# Patient Record
Sex: MALE | Race: BLACK OR AFRICAN AMERICAN | Employment: FULL TIME | URBAN - METROPOLITAN AREA
[De-identification: names, ages, dates, MRNs, and addresses within clinical notes are randomized per-mention and may not be internally consistent; named-entity substitution may affect disease eponyms.]

---

## 2020-12-10 ENCOUNTER — HOSPITAL ENCOUNTER (OUTPATIENT)
Age: 60
Setting detail: OBSERVATION
Discharge: HOME | End: 2020-12-11
Attending: EMERGENCY MEDICINE | Admitting: INTERNAL MEDICINE

## 2020-12-10 PROBLEM — E11.00 DM HYPEROSMOLARITY TYPE II, UNCONTROLLED (HCC): Status: ACTIVE | Noted: 2020-12-10

## 2020-12-10 LAB
A/G RATIO: 1.3 (ref 1.1–2.2)
ALBUMIN SERPL-MCNC: 4.5 G/DL (ref 3.4–5)
ALP BLD-CCNC: 144 U/L (ref 40–129)
ALT SERPL-CCNC: 36 U/L (ref 10–40)
ANION GAP SERPL CALCULATED.3IONS-SCNC: 16 MMOL/L (ref 3–16)
AST SERPL-CCNC: 32 U/L (ref 15–37)
BASOPHILS ABSOLUTE: 0.1 K/UL (ref 0–0.2)
BASOPHILS RELATIVE PERCENT: 1.4 %
BILIRUB SERPL-MCNC: 0.7 MG/DL (ref 0–1)
BILIRUBIN URINE: NEGATIVE
BLOOD, URINE: NEGATIVE
BUN BLDV-MCNC: 16 MG/DL (ref 7–20)
CALCIUM SERPL-MCNC: 9.6 MG/DL (ref 8.3–10.6)
CHLORIDE BLD-SCNC: 89 MMOL/L (ref 99–110)
CLARITY: CLEAR
CO2: 23 MMOL/L (ref 21–32)
COLOR: YELLOW
CREAT SERPL-MCNC: 1.1 MG/DL (ref 0.8–1.3)
CREATININE URINE: 80.2 MG/DL (ref 39–259)
EKG ATRIAL RATE: 72 BPM
EKG DIAGNOSIS: NORMAL
EKG P AXIS: 3 DEGREES
EKG P-R INTERVAL: 158 MS
EKG Q-T INTERVAL: 396 MS
EKG QRS DURATION: 84 MS
EKG QTC CALCULATION (BAZETT): 433 MS
EKG R AXIS: 27 DEGREES
EKG T AXIS: 18 DEGREES
EKG VENTRICULAR RATE: 72 BPM
EOSINOPHILS ABSOLUTE: 0.1 K/UL (ref 0–0.6)
EOSINOPHILS RELATIVE PERCENT: 2.1 %
GFR AFRICAN AMERICAN: >60
GFR NON-AFRICAN AMERICAN: >60
GLOBULIN: 3.5 G/DL
GLUCOSE BLD-MCNC: 343 MG/DL (ref 70–99)
GLUCOSE BLD-MCNC: 379 MG/DL (ref 70–99)
GLUCOSE BLD-MCNC: 387 MG/DL (ref 70–99)
GLUCOSE BLD-MCNC: 397 MG/DL (ref 70–99)
GLUCOSE BLD-MCNC: 522 MG/DL (ref 70–99)
GLUCOSE URINE: >=1000 MG/DL
HCT VFR BLD CALC: 41 % (ref 40.5–52.5)
HEMOGLOBIN: 14 G/DL (ref 13.5–17.5)
KETONES, URINE: ABNORMAL MG/DL
LEUKOCYTE ESTERASE, URINE: NEGATIVE
LYMPHOCYTES ABSOLUTE: 1.6 K/UL (ref 1–5.1)
LYMPHOCYTES RELATIVE PERCENT: 27.1 %
MCH RBC QN AUTO: 28.6 PG (ref 26–34)
MCHC RBC AUTO-ENTMCNC: 34.1 G/DL (ref 31–36)
MCV RBC AUTO: 83.8 FL (ref 80–100)
MICROALBUMIN UR-MCNC: 2.2 MG/DL
MICROALBUMIN/CREAT UR-RTO: 27.4 MG/G (ref 0–30)
MICROSCOPIC EXAMINATION: ABNORMAL
MONOCYTES ABSOLUTE: 0.3 K/UL (ref 0–1.3)
MONOCYTES RELATIVE PERCENT: 5.4 %
NEUTROPHILS ABSOLUTE: 3.7 K/UL (ref 1.7–7.7)
NEUTROPHILS RELATIVE PERCENT: 64 %
NITRITE, URINE: NEGATIVE
PDW BLD-RTO: 13.6 % (ref 12.4–15.4)
PERFORMED ON: ABNORMAL
PH UA: 5 (ref 5–8)
PLATELET # BLD: 228 K/UL (ref 135–450)
PMV BLD AUTO: 9.7 FL (ref 5–10.5)
POTASSIUM REFLEX MAGNESIUM: 4.3 MMOL/L (ref 3.5–5.1)
PROTEIN UA: NEGATIVE MG/DL
RBC # BLD: 4.89 M/UL (ref 4.2–5.9)
SODIUM BLD-SCNC: 128 MMOL/L (ref 136–145)
SPECIFIC GRAVITY UA: >1.03 (ref 1–1.03)
TOTAL PROTEIN: 8 G/DL (ref 6.4–8.2)
TROPONIN: <0.01 NG/ML
URINE REFLEX TO CULTURE: ABNORMAL
URINE TYPE: ABNORMAL
UROBILINOGEN, URINE: 0.2 E.U./DL
WBC # BLD: 5.7 K/UL (ref 4–11)

## 2020-12-10 PROCEDURE — 99223 1ST HOSP IP/OBS HIGH 75: CPT | Performed by: INTERNAL MEDICINE

## 2020-12-10 PROCEDURE — 93010 ELECTROCARDIOGRAM REPORT: CPT | Performed by: INTERNAL MEDICINE

## 2020-12-10 PROCEDURE — 96361 HYDRATE IV INFUSION ADD-ON: CPT

## 2020-12-10 PROCEDURE — 80053 COMPREHEN METABOLIC PANEL: CPT

## 2020-12-10 PROCEDURE — 96374 THER/PROPH/DIAG INJ IV PUSH: CPT

## 2020-12-10 PROCEDURE — 84484 ASSAY OF TROPONIN QUANT: CPT

## 2020-12-10 PROCEDURE — 6370000000 HC RX 637 (ALT 250 FOR IP): Performed by: EMERGENCY MEDICINE

## 2020-12-10 PROCEDURE — 96372 THER/PROPH/DIAG INJ SC/IM: CPT

## 2020-12-10 PROCEDURE — 96360 HYDRATION IV INFUSION INIT: CPT

## 2020-12-10 PROCEDURE — 81003 URINALYSIS AUTO W/O SCOPE: CPT

## 2020-12-10 PROCEDURE — 2580000003 HC RX 258: Performed by: INTERNAL MEDICINE

## 2020-12-10 PROCEDURE — 94760 N-INVAS EAR/PLS OXIMETRY 1: CPT

## 2020-12-10 PROCEDURE — 93005 ELECTROCARDIOGRAM TRACING: CPT | Performed by: EMERGENCY MEDICINE

## 2020-12-10 PROCEDURE — 82043 UR ALBUMIN QUANTITATIVE: CPT

## 2020-12-10 PROCEDURE — 2580000003 HC RX 258: Performed by: EMERGENCY MEDICINE

## 2020-12-10 PROCEDURE — G0378 HOSPITAL OBSERVATION PER HR: HCPCS

## 2020-12-10 PROCEDURE — 82570 ASSAY OF URINE CREATININE: CPT

## 2020-12-10 PROCEDURE — 6370000000 HC RX 637 (ALT 250 FOR IP): Performed by: INTERNAL MEDICINE

## 2020-12-10 PROCEDURE — 99283 EMERGENCY DEPT VISIT LOW MDM: CPT

## 2020-12-10 PROCEDURE — 85025 COMPLETE CBC W/AUTO DIFF WBC: CPT

## 2020-12-10 PROCEDURE — 6360000002 HC RX W HCPCS: Performed by: INTERNAL MEDICINE

## 2020-12-10 RX ORDER — NICOTINE POLACRILEX 4 MG
15 LOZENGE BUCCAL PRN
Status: DISCONTINUED | OUTPATIENT
Start: 2020-12-10 | End: 2020-12-11 | Stop reason: HOSPADM

## 2020-12-10 RX ORDER — INSULIN GLARGINE 100 [IU]/ML
18 INJECTION, SOLUTION SUBCUTANEOUS ONCE
Status: COMPLETED | OUTPATIENT
Start: 2020-12-10 | End: 2020-12-10

## 2020-12-10 RX ORDER — SODIUM CHLORIDE 0.9 % (FLUSH) 0.9 %
10 SYRINGE (ML) INJECTION PRN
Status: DISCONTINUED | OUTPATIENT
Start: 2020-12-10 | End: 2020-12-11 | Stop reason: HOSPADM

## 2020-12-10 RX ORDER — POLYETHYLENE GLYCOL 3350 17 G/17G
17 POWDER, FOR SOLUTION ORAL DAILY PRN
Status: DISCONTINUED | OUTPATIENT
Start: 2020-12-10 | End: 2020-12-11 | Stop reason: HOSPADM

## 2020-12-10 RX ORDER — 0.9 % SODIUM CHLORIDE 0.9 %
1000 INTRAVENOUS SOLUTION INTRAVENOUS ONCE
Status: COMPLETED | OUTPATIENT
Start: 2020-12-10 | End: 2020-12-10

## 2020-12-10 RX ORDER — SODIUM CHLORIDE 9 MG/ML
INJECTION, SOLUTION INTRAVENOUS CONTINUOUS
Status: DISCONTINUED | OUTPATIENT
Start: 2020-12-10 | End: 2020-12-11 | Stop reason: HOSPADM

## 2020-12-10 RX ORDER — DEXTROSE MONOHYDRATE 50 MG/ML
100 INJECTION, SOLUTION INTRAVENOUS PRN
Status: DISCONTINUED | OUTPATIENT
Start: 2020-12-10 | End: 2020-12-11 | Stop reason: HOSPADM

## 2020-12-10 RX ORDER — PROMETHAZINE HYDROCHLORIDE 25 MG/1
12.5 TABLET ORAL EVERY 6 HOURS PRN
Status: DISCONTINUED | OUTPATIENT
Start: 2020-12-10 | End: 2020-12-11 | Stop reason: HOSPADM

## 2020-12-10 RX ORDER — ACETAMINOPHEN 650 MG/1
650 SUPPOSITORY RECTAL EVERY 6 HOURS PRN
Status: DISCONTINUED | OUTPATIENT
Start: 2020-12-10 | End: 2020-12-11 | Stop reason: HOSPADM

## 2020-12-10 RX ORDER — DEXTROSE MONOHYDRATE 25 G/50ML
12.5 INJECTION, SOLUTION INTRAVENOUS PRN
Status: DISCONTINUED | OUTPATIENT
Start: 2020-12-10 | End: 2020-12-11 | Stop reason: HOSPADM

## 2020-12-10 RX ORDER — ONDANSETRON 2 MG/ML
4 INJECTION INTRAMUSCULAR; INTRAVENOUS EVERY 6 HOURS PRN
Status: DISCONTINUED | OUTPATIENT
Start: 2020-12-10 | End: 2020-12-11 | Stop reason: HOSPADM

## 2020-12-10 RX ORDER — ACETAMINOPHEN 325 MG/1
650 TABLET ORAL EVERY 6 HOURS PRN
Status: DISCONTINUED | OUTPATIENT
Start: 2020-12-10 | End: 2020-12-11 | Stop reason: HOSPADM

## 2020-12-10 RX ORDER — NAPROXEN 500 MG/1
500 TABLET ORAL 2 TIMES DAILY PRN
Status: ON HOLD | COMMUNITY
End: 2020-12-11 | Stop reason: HOSPADM

## 2020-12-10 RX ORDER — SODIUM CHLORIDE 0.9 % (FLUSH) 0.9 %
10 SYRINGE (ML) INJECTION EVERY 12 HOURS SCHEDULED
Status: DISCONTINUED | OUTPATIENT
Start: 2020-12-10 | End: 2020-12-11 | Stop reason: HOSPADM

## 2020-12-10 RX ORDER — ATORVASTATIN CALCIUM 20 MG/1
20 TABLET, FILM COATED ORAL NIGHTLY
Status: DISCONTINUED | OUTPATIENT
Start: 2020-12-10 | End: 2020-12-11 | Stop reason: HOSPADM

## 2020-12-10 RX ADMIN — METFORMIN HYDROCHLORIDE 500 MG: 500 TABLET ORAL at 17:22

## 2020-12-10 RX ADMIN — INSULIN HUMAN 10 UNITS: 100 INJECTION, SOLUTION PARENTERAL at 11:17

## 2020-12-10 RX ADMIN — ATORVASTATIN CALCIUM 20 MG: 20 TABLET, FILM COATED ORAL at 23:12

## 2020-12-10 RX ADMIN — SODIUM CHLORIDE 1000 ML: 9 INJECTION, SOLUTION INTRAVENOUS at 10:27

## 2020-12-10 RX ADMIN — SODIUM CHLORIDE: 9 INJECTION, SOLUTION INTRAVENOUS at 14:42

## 2020-12-10 RX ADMIN — ENOXAPARIN SODIUM 40 MG: 40 INJECTION SUBCUTANEOUS at 23:12

## 2020-12-10 RX ADMIN — INSULIN GLARGINE 18 UNITS: 100 INJECTION, SOLUTION SUBCUTANEOUS at 14:42

## 2020-12-10 RX ADMIN — SODIUM CHLORIDE 1000 ML: 9 INJECTION, SOLUTION INTRAVENOUS at 11:17

## 2020-12-10 RX ADMIN — INSULIN LISPRO 10 UNITS: 100 INJECTION, SOLUTION INTRAVENOUS; SUBCUTANEOUS at 17:22

## 2020-12-10 RX ADMIN — INSULIN LISPRO 5 UNITS: 100 INJECTION, SOLUTION INTRAVENOUS; SUBCUTANEOUS at 23:12

## 2020-12-10 ASSESSMENT — ENCOUNTER SYMPTOMS
RHINORRHEA: 0
WHEEZING: 0
BACK PAIN: 0
EYE DISCHARGE: 0
EYE PAIN: 0
SHORTNESS OF BREATH: 0
SORE THROAT: 0
COUGH: 0
NAUSEA: 0
DIARRHEA: 0
EYE REDNESS: 0
VOMITING: 0
ABDOMINAL PAIN: 0

## 2020-12-10 ASSESSMENT — PAIN DESCRIPTION - LOCATION: LOCATION: FOOT

## 2020-12-10 ASSESSMENT — PAIN DESCRIPTION - PROGRESSION
CLINICAL_PROGRESSION: NOT CHANGED

## 2020-12-10 ASSESSMENT — PAIN SCALES - GENERAL
PAINLEVEL_OUTOF10: 3
PAINLEVEL_OUTOF10: 0

## 2020-12-10 ASSESSMENT — PAIN DESCRIPTION - ONSET: ONSET: ON-GOING

## 2020-12-10 ASSESSMENT — PAIN DESCRIPTION - PAIN TYPE: TYPE: ACUTE PAIN

## 2020-12-10 ASSESSMENT — PAIN DESCRIPTION - ORIENTATION: ORIENTATION: RIGHT;LEFT

## 2020-12-10 ASSESSMENT — PAIN - FUNCTIONAL ASSESSMENT: PAIN_FUNCTIONAL_ASSESSMENT: ACTIVITIES ARE NOT PREVENTED

## 2020-12-10 ASSESSMENT — PAIN DESCRIPTION - FREQUENCY: FREQUENCY: INTERMITTENT

## 2020-12-10 ASSESSMENT — PAIN DESCRIPTION - DESCRIPTORS: DESCRIPTORS: SORE

## 2020-12-10 NOTE — PROGRESS NOTES
Pre dinner blood sugar is 397. Spoke with Dr Payton Hays about this, see orders to change low insulin sliding scale to medium sliding scale. Will continue to monitor.

## 2020-12-10 NOTE — ED NOTES
ED SBAR report provider to [address]. Patient to be transported to Room 4125 via stretcher by ED tech. Patient transported with bedside cardiac monitor. IV site clean, dry, and intact. MEWS score and pain assessed as 0 and documented. Updated patient on plan of care.        Marian Bardales RN  12/10/20 1605

## 2020-12-10 NOTE — H&P
Hospital Medicine History & Physical    Patient:  Roberta Greenberg  YOB: 1960  Date of Service: 12/10/20  MRN: [id number]    PCP: Freddi Skiff, MD    Date of Admission: 12/10/2020      Chief Complaint:    Chief Complaint   Patient presents with    Fatigue     x3-4 days, \"been going to the bathroom every 10-20 minutes, very dehydrated\"         History Of Present Illness: The patient is a 63 y.o. male who presents to [facility] with c/o as above  Diagnosed with hyperglycemia    Past Medical History:    History reviewed. No pertinent past medical history. Past Surgical History:    History reviewed. No pertinent surgical history. Family History:  History reviewed. No pertinent family history. Medications Prior to Admission:    Prior to Admission medications    Medication Sig Start Date End Date Taking? Authorizing Provider   naproxen (NAPROSYN) 500 MG tablet Take 500 mg by mouth 2 times daily as needed for Pain   Yes Historical Provider, MD       Allergies:  Patient has no known allergies.     Social History:    Social History     Socioeconomic History    Marital status:      Spouse name: Not on file    Number of children: Not on file    Years of education: Not on file    Highest education level: Not on file   Occupational History    Not on file   Social Needs    Financial resource strain: Not on file    Food insecurity     Worry: Not on file     Inability: Not on file    Transportation needs     Medical: Not on file     Non-medical: Not on file   Tobacco Use    Smoking status: Never Smoker    Smokeless tobacco: Never Used   Substance and Sexual Activity    Alcohol use: Not on file    Drug use: Not on file    Sexual activity: Not on file   Lifestyle    Physical activity     Days per week: Not on file     Minutes per session: Not on file    Stress: Not on file   Relationships    Social connections     Talks on phone: Not on file     Gets together: Not on file     Attends Anglican service: Not on file     Active member of club or organization: Not on file     Attends meetings of clubs or organizations: Not on file     Relationship status: Not on file    Intimate partner violence     Fear of current or ex partner: Not on file     Emotionally abused: Not on file     Physically abused: Not on file     Forced sexual activity: Not on file   Other Topics Concern    Not on file   Social History Narrative    Not on file       TOBACCO:   reports that he has never smoked. He has never used smokeless tobacco.  ETOH:   has no history on file for alcohol. REVIEW OF SYSTEMS:    Vital: /71   Pulse 82   Temp 98.3 °F (36.8 °C) (Oral)   Resp 17   Ht 5' 7\" (1.702 m)   SpO2 98%   Constitutional: no fever, no night sweats,  fatigue  Head: no headache, no head injury, no migranes. Eye: no blurring of vision, no double vision. Ears: no hearing difficulty, no tinnitus  Mouth/throat: no ulceration, dental caries, dysphagia  Lungs: no cough, no shortness of breath, no wheeze  CVS: no palpitation, no chest pain, no shortness of breath  GI: no abdominal pain, no nausea , no vomiting, no constipation  HANNA: no dysuria, frequency and urgency, no hematuria, no kidney stones  Musculoskeletal:back pain  Endocrine: no polyuria, polydypsia, no cold or heat intolerence  Hematology: no anemia, no easy brusing or bleeding, no hx of clotting disorder  Dermatology: no skin rash, no eczema, no prurities,  Psychiatry: no depression, no anxiety,no panic attacks, no suicide ideation  Neurology: no syncope, no seizures, no numbness or tingling of hands, no numbness or tingling of feet, no paresis      PHYSICAL EXAM:  General:  Awake, alert, not in distress. Appears to be stated age. HEENT: Atraumatic, normocephalic. PERRLA. EOM intact.  Pink conjunctiva, anicteric sclera. Pink and moist oral mucosa. No lymphadenopathy. Trachea midline. Thyroid non palpable. Neck supple. No carotid bruit. No JVD. Chest: Bilateal air entry, Clear to auscultation, no wheezing, rhonchi or rales. Cardiovascular: RRR, K9N4, no murmur, click, rub or gallop. No lower extremity edema. Pedal and posterior tibialis 2+. Abdomen: Soft, non tender to palpation. Active bowel sounds x 4 quadrants. Musculoskeletal: Limitation of the rom of the joints and LSS  SLR is positive on the affected side  No gross weak ness appreciated  CNS: Oriented to person, place and time. CXR:   No orders to display     EKG:  I have reviewed the EKG. CBC   Recent Labs     12/10/20  1010   WBC 5.7   HGB 14.0   HCT 41.0         RENAL  Recent Labs     12/10/20  1010   *   K 4.3   CL 89*   CO2 23   BUN 16   CREATININE 1.1     LFT'S  Recent Labs     12/10/20  1010   AST 32   ALT 36   BILITOT 0.7   ALKPHOS 144*     COAG  No results for input(s): INR in the last 72 hours. CARDIAC ENZYMES  Recent Labs     12/10/20  1010   TROPONINI <0.01       U/A:    Lab Results   Component Value Date    COLORU YELLOW 12/10/2020    CLARITYU Clear 12/10/2020    SPECGRAV >1.030 12/10/2020    LEUKOCYTESUR Negative 12/10/2020    BLOODU Negative 12/10/2020    GLUCOSEU >=1000 12/10/2020       ABG  No results found for: QMC5YHP, BEART, V4YQBWSA, PHART, THGBART, GZM0TAX, PO2ART, DDL6JQL        There are no active hospital problems to display for this patient. ASSESSMENT/PLAN:  New onset DM with hyperosmolarity  High cholesterol    Pt is stable. Discussed with staff and pt about the plan. See the orders for further plan. Will proceed further acc to pt's progress.     Electronically signed by Bryson Snyder MD on 12/10/2020 at 12:08 PM

## 2020-12-10 NOTE — PROGRESS NOTES
Admitted to room 4125 from ER. Oriented to room and call light. Vitals and assessment completed. No distress noted.

## 2020-12-10 NOTE — CARE COORDINATION
[facility]  Diabetes Education   Progress Note       NAME:  Marylee Fortis  MEDICAL RECORD NUMBER:  [id number]  AGE: 63 y.o. GENDER: male  : 1960  TODAY'S DATE:  12/10/2020    Subjective   Reason for Diabetic Education Evaluation and Assessment: new onset diabetes     Evelyn Colon agrees to meet with me for diabetes education. He is not surprised by the diagnosis since both of his parents had diabetes. 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 Visit Type: evaluation      Marylee Fortis is a 63 y.o. male referred by:     [x] Physician  [] Nursing  [] Chart Review   [] Other:     PAST MEDICAL HISTORY    History reviewed. No pertinent past medical history. PAST SURGICAL HISTORY    History reviewed. No pertinent surgical history. FAMILY HISTORY    History reviewed. No pertinent family history.     SOCIAL HISTORY    Social History     Tobacco Use    Smoking status: Never Smoker    Smokeless tobacco: Never Used   Substance Use Topics    Alcohol use: Not on file    Drug use: Not on file       ALLERGIES    No Known Allergies    MEDICATIONS     sodium chloride flush  10 mL Intravenous 2 times per day    enoxaparin  40 mg Subcutaneous Nightly    insulin lispro  0-6 Units Subcutaneous TID WC    insulin lispro  0-3 Units Subcutaneous Nightly    metFORMIN  500 mg Oral BID WC    atorvastatin  20 mg Oral Nightly       Objective        Patient Active Problem List   Diagnosis Code    DM hyperosmolarity type II, uncontrolled (HCC) E11.00, E11.65        BP (!) 153/86   Pulse 79   Temp 98.4 °F (36.9 °C) (Oral)   Resp 16   Ht 5' 7\" (1.702 m)   Wt 237 lb 7 oz (107.7 kg)   SpO2 96%   BMI 37.19 kg/m²     HgBA1c:  No results found for: LABA1C    Recent Labs     12/10/20  1214 12/10/20  1435 12/10/20  1646   POCGLU 379* 343* 397*       BUN/Creatinine:    Lab Results   Component Value Date    BUN 16 12/10/2020 CREATININE 1.1 12/10/2020       Assessment        Diabetes Management and Education    Does the patient have a Primary Care Physician? Yes, Jenifer Cunningham MD       Does the patient require new medication instruction? Yes,     Person responsible for administration of Insulin/Medication:       [x] Self     [] Caregiver       [] Spouse       [] Other Family Member   []  Other    Insulin Instruction:  insulin pen  Injection Site:   [x] location    [x] rotation     Level of patient/caregiver understanding able to:       [x] Verbalized Understanding   [] Demonstrated Understanding       [x] Teach Back       [] Needs Reinforcement     []  Other:        Does the patient/caregiver monitor Blood Glucoses? No:   Reviewed glycemic control targets, testing frequency and when to call PCP. Level of patient/caregiver understanding able to:        [x] Verbalized Understanding   [] Demonstrated Understanding       [] Teach Back       [] Needs Reinforcement     []  Other:        Does the patient/caregiver follow a Meal Plan? No: Drinks water, juice and regular soda. Recommend making water, unsweetened tea or coffee primary drinks. Reviewed importance of eating three meals per day and plate method for consistent carb intake. Level of patient/caregiver understanding able to:       [x] Verbalized Understanding   [] Demonstrated Understanding       [] Teach Back       [] Needs Reinforcement     []  Other:      Does the patient/caregiver understand S/S of Hypoglycemia? No:   Reviewed symptoms, prevention and treatment. Level of patient/caregiver understanding able to:       [x] Verbalized Understanding   [] Demonstrated Understanding       [] Teach Back       [] Needs Reinforcement     []  Other:                    Does the patient/caregiver understand S/S of Hyperglycemia? No:     Reviewed symptoms, prevention and treatment.     Level of patient/caregiver understanding able to:        [x] Verbalized Understanding

## 2020-12-10 NOTE — ED PROVIDER NOTES
[address] [place] [address]  eMERGENCY dEPARTMENT eNCOUnter        Pt Name: Roberta Greenberg  MRN: [id number]  [place] 1960  Date of evaluation: 12/10/2020  Provider: Cecile Nava MD  PCP: Freddi Skiff, MD      [address] [place] [address]       Chief Complaint   Patient presents with    Fatigue     x3-4 days, \"been going to the bathroom every 10-20 minutes, very dehydrated\"       HISTORY OFPRESENT ILLNESS   (Location/Symptom, Timing/Onset, Context/Setting, Quality, Duration, Modifying Factors,Severity)  Note limiting factors. Roberta Greenberg is a 63 y.o. male       Location/Symptom: Complaining of feeling tired and feeling like he is dehydrated  Timing/Onset: 4 days  Context/Setting: Ventricular the patient has multiple symptoms that are consistent with new onset diabetes. It does run in his family. He is complaining of a dry mouth polyuria blurred vision and generalized fatigue. Quality: He is not really having any chest pain headache fever chills abdominal pain or nausea. Duration: 4 days  Modifying Factors: None  Severity: 0    Nursing Noteswere all reviewed and agreed with or any disagreements were addressed  in the HPI. REVIEW OF SYSTEMS    (2-9 systems for level 4, 10 or more for level 5)     Review of Systems   Constitutional: Positive for fatigue. Negative for chills and fever. HENT: Negative for ear pain, rhinorrhea and sore throat. Eyes: Positive for visual disturbance. Negative for pain, discharge and redness. Respiratory: Negative for cough, shortness of breath and wheezing. Cardiovascular: Negative for chest pain, palpitations and leg swelling. Gastrointestinal: Negative for abdominal pain, diarrhea, nausea and vomiting. Endocrine: Positive for polyuria. Genitourinary: Negative for difficulty urinating and dysuria. Musculoskeletal: Negative for arthralgias, back pain and myalgias. Skin: Negative for rash. Allergic/Immunologic: Negative for environmental allergies. Neurological: Negative for dizziness, seizures, syncope and headaches. Hematological: Negative for adenopathy. Psychiatric/Behavioral: Negative for suicidal ideas. The patient is not nervous/anxious. PAST MEDICAL HISTORY   History reviewed. No pertinent past medical history. SURGICAL HISTORY   History reviewed. No pertinent surgical history. [address]       Current Discharge Medication List      CONTINUE these medications which have NOT CHANGED    Details   naproxen (NAPROSYN) 500 MG tablet Take 500 mg by mouth 2 times daily as needed for Pain             ALLERGIES     Patient has no known allergies. FAMILY HISTORY     History reviewed. No pertinent family history.        SOCIAL HISTORY       Social History     Socioeconomic History    Marital status:      Spouse name: None    Number of children: None    Years of education: None    Highest education level: None   Occupational History    None   Social Needs    Financial resource strain: None    Food insecurity     Worry: None     Inability: None    Transportation needs     Medical: None     Non-medical: None   Tobacco Use    Smoking status: Never Smoker    Smokeless tobacco: Never Used   Substance and Sexual Activity    Alcohol use: None    Drug use: None    Sexual activity: None   Lifestyle    Physical activity     Days per week: None     Minutes per session: None    Stress: None   Relationships    Social connections     Talks on phone: None     Gets together: None     Attends Moravian service: None     Active member of club or organization: None     Attends meetings of clubs or organizations: None     Relationship status: None    Intimate partner violence     Fear of current or ex partner: None     Emotionally abused: None     Physically abused: None     Forced sexual activity: None   Other Topics Concern    None   Social History Narrative    None       SCREENINGS             PHYSICAL EXAM    (up to 7 for level 4, 8 or more for level 5)     ED Triage Vitals [12/10/20 0944]   BP Temp Temp Source Pulse Resp SpO2 Height Weight   (!) 150/54 98.3 °F (36.8 °C) Oral 82 20 98 % 5' 7\" (1.702 m) --      height is 5' 7\" (1.702 m) and weight is 237 lb 7 oz (107.7 kg). His oral temperature is 98.4 °F (36.9 °C). His blood pressure is 153/86 (abnormal) and his pulse is 79. His respiration is 16 and oxygen saturation is 97%. Physical Exam  Constitutional:       Appearance: He is well-developed. He is not diaphoretic. HENT:      Head: Normocephalic and atraumatic. Right Ear: External ear normal.      Left Ear: External ear normal.   Eyes:      General: No scleral icterus. Right eye: No discharge. Left eye: No discharge. Neck:      Musculoskeletal: Normal range of motion. Thyroid: No thyromegaly. Vascular: No JVD. Trachea: No tracheal deviation. Cardiovascular:      Rate and Rhythm: Normal rate and regular rhythm. Heart sounds: No murmur. No friction rub. No gallop. Pulmonary:      Effort: Pulmonary effort is normal. No respiratory distress. Breath sounds: Normal breath sounds. No stridor. No wheezing or rales. Abdominal:      General: There is no distension. Palpations: Abdomen is soft. Tenderness: There is no abdominal tenderness. There is no guarding or rebound. Musculoskeletal:         General: No tenderness. Skin:     General: Skin is warm and dry. Findings: No rash (On exposed body surfaces). Neurological:      Mental Status: He is alert and oriented to person, place, and time. Coordination: Coordination normal.   Psychiatric:         Behavior: Behavior normal.         Thought Content:  Thought content normal.         DIAGNOSTIC RESULTS   LABS:    Results for orders placed or performed during the hospital encounter of 12/10/20   CBC Auto Differential   Result Value Ref Range    WBC 5.7 4.0 - 11.0 K/uL    RBC 4.89 4.20 - 5.90 M/uL Hemoglobin 14.0 13.5 - 17.5 g/dL    Hematocrit 41.0 40.5 - 52.5 %    MCV 83.8 80.0 - 100.0 fL    MCH 28.6 26.0 - 34.0 pg    MCHC 34.1 31.0 - 36.0 g/dL    RDW 13.6 12.4 - 15.4 %    Platelets 490 366 - 190 K/uL    MPV 9.7 5.0 - 10.5 fL    Neutrophils % 64.0 %    Lymphocytes % 27.1 %    Monocytes % 5.4 %    Eosinophils % 2.1 %    Basophils % 1.4 %    Neutrophils Absolute 3.7 1.7 - 7.7 K/uL    Lymphocytes Absolute 1.6 1.0 - 5.1 K/uL    Monocytes Absolute 0.3 0.0 - 1.3 K/uL    Eosinophils Absolute 0.1 0.0 - 0.6 K/uL    Basophils Absolute 0.1 0.0 - 0.2 K/uL   Comprehensive Metabolic Panel w/ Reflex to MG   Result Value Ref Range    Sodium 128 (L) 136 - 145 mmol/L    Potassium reflex Magnesium 4.3 3.5 - 5.1 mmol/L    Chloride 89 (L) 99 - 110 mmol/L    CO2 23 21 - 32 mmol/L    Anion Gap 16 3 - 16    Glucose 522 (H) 70 - 99 mg/dL    BUN 16 7 - 20 mg/dL    CREATININE 1.1 0.8 - 1.3 mg/dL    GFR Non-African American >60 >60    GFR African American >60 >60    Calcium 9.6 8.3 - 10.6 mg/dL    Total Protein 8.0 6.4 - 8.2 g/dL    Alb 4.5 3.4 - 5.0 g/dL    Albumin/Globulin Ratio 1.3 1.1 - 2.2    Total Bilirubin 0.7 0.0 - 1.0 mg/dL    Alkaline Phosphatase 144 (H) 40 - 129 U/L    ALT 36 10 - 40 U/L    AST 32 15 - 37 U/L    Globulin 3.5 g/dL   Troponin   Result Value Ref Range    Troponin <0.01 <0.01 ng/mL   Urinalysis Reflex to Culture    Specimen: Urine, clean catch   Result Value Ref Range    Color, UA YELLOW Straw/Yellow    Clarity, UA Clear Clear    Glucose, Ur >=1000 (A) Negative mg/dL    Bilirubin Urine Negative Negative    Ketones, Urine TRACE (A) Negative mg/dL    Specific Gravity, UA >1.030 1.005 - 1.030    Blood, Urine Negative Negative    pH, UA 5.0 5.0 - 8.0    Protein, UA Negative Negative mg/dL    Urobilinogen, Urine 0.2 <2.0 E.U./dL    Nitrite, Urine Negative Negative    Leukocyte Esterase, Urine Negative Negative    Microscopic Examination Not Indicated     Urine Type NotGiven     Urine Reflex to Culture Not Indicated POCT Glucose   Result Value Ref Range    POC Glucose 379 (H) 70 - 99 mg/dl    Performed on ACCU-CHEK    POCT Glucose   Result Value Ref Range    POC Glucose 343 (H) 70 - 99 mg/dl    Performed on ACCU-CHEK    EKG 12 Lead   Result Value Ref Range    Ventricular Rate 72 BPM    Atrial Rate 72 BPM    P-R Interval 158 ms    QRS Duration 84 ms    Q-T Interval 396 ms    QTc Calculation (Bazett) 433 ms    P Axis 3 degrees    R Axis 27 degrees    T Axis 18 degrees    Diagnosis       Normal sinus rhythmST elevation, consider early repolarization, pericarditis, or injuryAbnormal ECGNo previous ECGs availableConfirmed by Lakia CARRILLO MD ([id number]) on 12/10/2020 11:28:35 AM       All other labs were within normal range or not returned as of this dictation. EKG: All EKG's are interpreted by the Emergency Department Physician who either signs orCo-signs this chart in the absence of a cardiologist.    EKG visualized preliminarily interpreted by myself shows sinus rhythm at a rate of 72. Somewhat diffuse early repolarization type changes. Nonspecific T wave flattening. Normal axis of 27. Intervals are normal.  Otherwise unremarkable EKG but no previous to compare to    RADIOLOGY:   Non-plain film images such as CT, Ultrasound and MRI are read by the radiologist. Plain radiographic images are visualized and preliminarily interpreted by the  EDProvider with the below findings:    No results found.           PROCEDURES   Unless otherwise noted below, none     Procedures    CRITICAL CARE TIME   N/A    CONSULTS:  IP CONSULT TO PRIMARY CARE PROVIDER  IP CONSULT TO DIABETES EDUCATOR    EMERGENCY DEPARTMENT COURSE and DIFFERENTIAL DIAGNOSIS/MDM:   Vitals:    Vitals:    12/10/20 1302 12/10/20 1317 12/10/20 1332 12/10/20 1429   BP: (!) 162/95 (!) 141/83 (!) 150/76 (!) 153/86   Pulse: 67 67 71 79   Resp: 15 13 15 16   Temp:    98.4 °F (36.9 °C)   TempSrc:    Oral   SpO2: 94% 94% 95% 97%   Weight:    237 lb 7 oz (107.7 kg)   Height: Patient was given the following medications:  Medications   sodium chloride flush 0.9 % injection 10 mL (has no administration in time range)   sodium chloride flush 0.9 % injection 10 mL (has no administration in time range)   acetaminophen (TYLENOL) tablet 650 mg (has no administration in time range)     Or   acetaminophen (TYLENOL) suppository 650 mg (has no administration in time range)   polyethylene glycol (GLYCOLAX) packet 17 g (has no administration in time range)   promethazine (PHENERGAN) tablet 12.5 mg (has no administration in time range)     Or   ondansetron (ZOFRAN) injection 4 mg (has no administration in time range)   enoxaparin (LOVENOX) injection 40 mg (has no administration in time range)   0.9 % sodium chloride infusion ( Intravenous New Bag 12/10/20 1442)   insulin lispro (HUMALOG) injection vial 0-6 Units (has no administration in time range)   insulin lispro (HUMALOG) injection vial 0-3 Units (has no administration in time range)   metFORMIN (GLUCOPHAGE) tablet 500 mg (has no administration in time range)   atorvastatin (LIPITOR) tablet 20 mg (has no administration in time range)   glucose (GLUTOSE) 40 % oral gel 15 g (has no administration in time range)   dextrose 50 % IV solution (has no administration in time range)   glucagon (rDNA) injection 1 mg (has no administration in time range)   dextrose 5 % solution (has no administration in time range)   0.9 % sodium chloride bolus (0 mLs Intravenous Stopped 12/10/20 1208)   0.9 % sodium chloride bolus (0 mLs Intravenous Stopped 12/10/20 1437)   insulin regular (HUMULIN R;NOVOLIN R) injection 10 Units (10 Units Intravenous Given 12/10/20 1117)   insulin glargine (LANTUS) injection vial 18 Units (18 Units Subcutaneous Given 12/10/20 1442)       Stable. He was given 2 L of fluid and 10 units of insulin. FINAL IMPRESSION      1. Diabetes mellitus, new onset ([address]    2.  Lightheadedness          DISPOSITION/PLAN    DISPOSITION Admitted 12/10/2020 12:09:54 PM      PATIENT REFERRED TO:  No follow-up provider specified.     DISCHARGE MEDICATIONS:  Current Discharge Medication List          DISCONTINUED MEDICATIONS:  Current Discharge Medication List                 (Please note that portions of this note were completed with a voice recognition program.  Efforts were made to editthe dictations but occasionally words are mis-transcribed.)    Tye Cha MD (electronically signed)            Tye Cha MD  12/10/20 2768

## 2020-12-11 VITALS
RESPIRATION RATE: 16 BRPM | OXYGEN SATURATION: 93 % | HEIGHT: 67 IN | WEIGHT: 236.99 LBS | BODY MASS INDEX: 37.2 KG/M2 | TEMPERATURE: 98.6 F | DIASTOLIC BLOOD PRESSURE: 82 MMHG | HEART RATE: 80 BPM | SYSTOLIC BLOOD PRESSURE: 153 MMHG

## 2020-12-11 LAB
A/G RATIO: 1.3 (ref 1.1–2.2)
ALBUMIN SERPL-MCNC: 3.7 G/DL (ref 3.4–5)
ALP BLD-CCNC: 110 U/L (ref 40–129)
ALT SERPL-CCNC: 33 U/L (ref 10–40)
ANION GAP SERPL CALCULATED.3IONS-SCNC: 12 MMOL/L (ref 3–16)
AST SERPL-CCNC: 36 U/L (ref 15–37)
BASOPHILS ABSOLUTE: 0 K/UL (ref 0–0.2)
BASOPHILS RELATIVE PERCENT: 1 %
BILIRUB SERPL-MCNC: 0.3 MG/DL (ref 0–1)
BUN BLDV-MCNC: 14 MG/DL (ref 7–20)
CALCIUM SERPL-MCNC: 8.8 MG/DL (ref 8.3–10.6)
CHLORIDE BLD-SCNC: 101 MMOL/L (ref 99–110)
CHOLESTEROL, TOTAL: 259 MG/DL (ref 0–199)
CO2: 20 MMOL/L (ref 21–32)
CREAT SERPL-MCNC: 0.9 MG/DL (ref 0.8–1.3)
EOSINOPHILS ABSOLUTE: 0.1 K/UL (ref 0–0.6)
EOSINOPHILS RELATIVE PERCENT: 2 %
ESTIMATED AVERAGE GLUCOSE: 286.2 MG/DL
GFR AFRICAN AMERICAN: >60
GFR NON-AFRICAN AMERICAN: >60
GLOBULIN: 2.9 G/DL
GLUCOSE BLD-MCNC: 284 MG/DL (ref 70–99)
GLUCOSE BLD-MCNC: 326 MG/DL (ref 70–99)
GLUCOSE BLD-MCNC: 330 MG/DL (ref 70–99)
GLUCOSE BLD-MCNC: 330 MG/DL (ref 70–99)
HBA1C MFR BLD: 11.6 %
HCT VFR BLD CALC: 39.2 % (ref 40.5–52.5)
HDLC SERPL-MCNC: 28 MG/DL (ref 40–60)
HEMOGLOBIN: 13.1 G/DL (ref 13.5–17.5)
LDL CHOLESTEROL CALCULATED: ABNORMAL MG/DL
LDL CHOLESTEROL DIRECT: 146 MG/DL
LYMPHOCYTES ABSOLUTE: 1.7 K/UL (ref 1–5.1)
LYMPHOCYTES RELATIVE PERCENT: 37 %
MCH RBC QN AUTO: 28.7 PG (ref 26–34)
MCHC RBC AUTO-ENTMCNC: 33.3 G/DL (ref 31–36)
MCV RBC AUTO: 86.1 FL (ref 80–100)
MONOCYTES ABSOLUTE: 0.3 K/UL (ref 0–1.3)
MONOCYTES RELATIVE PERCENT: 5.7 %
NEUTROPHILS ABSOLUTE: 2.5 K/UL (ref 1.7–7.7)
NEUTROPHILS RELATIVE PERCENT: 54.3 %
PDW BLD-RTO: 13.7 % (ref 12.4–15.4)
PERFORMED ON: ABNORMAL
PLATELET # BLD: 202 K/UL (ref 135–450)
PMV BLD AUTO: 9.7 FL (ref 5–10.5)
POTASSIUM SERPL-SCNC: 4.4 MMOL/L (ref 3.5–5.1)
PROSTATE SPECIFIC ANTIGEN: 0.66 NG/ML (ref 0–4)
RBC # BLD: 4.55 M/UL (ref 4.2–5.9)
SODIUM BLD-SCNC: 133 MMOL/L (ref 136–145)
TOTAL PROTEIN: 6.6 G/DL (ref 6.4–8.2)
TRIGL SERPL-MCNC: 804 MG/DL (ref 0–150)
TSH REFLEX: 1.08 UIU/ML (ref 0.27–4.2)
VLDLC SERPL CALC-MCNC: ABNORMAL MG/DL
WBC # BLD: 4.7 K/UL (ref 4–11)

## 2020-12-11 PROCEDURE — 36415 COLL VENOUS BLD VENIPUNCTURE: CPT

## 2020-12-11 PROCEDURE — G0378 HOSPITAL OBSERVATION PER HR: HCPCS

## 2020-12-11 PROCEDURE — 84153 ASSAY OF PSA TOTAL: CPT

## 2020-12-11 PROCEDURE — 6370000000 HC RX 637 (ALT 250 FOR IP): Performed by: INTERNAL MEDICINE

## 2020-12-11 PROCEDURE — 83036 HEMOGLOBIN GLYCOSYLATED A1C: CPT

## 2020-12-11 PROCEDURE — 80061 LIPID PANEL: CPT

## 2020-12-11 PROCEDURE — 80053 COMPREHEN METABOLIC PANEL: CPT

## 2020-12-11 PROCEDURE — 84443 ASSAY THYROID STIM HORMONE: CPT

## 2020-12-11 PROCEDURE — 83721 ASSAY OF BLOOD LIPOPROTEIN: CPT

## 2020-12-11 PROCEDURE — 85025 COMPLETE CBC W/AUTO DIFF WBC: CPT

## 2020-12-11 PROCEDURE — 99238 HOSP IP/OBS DSCHRG MGMT 30/<: CPT | Performed by: INTERNAL MEDICINE

## 2020-12-11 RX ORDER — PREDNISONE 20 MG/1
20 TABLET ORAL DAILY
Status: ON HOLD | COMMUNITY
Start: 2020-12-07 | End: 2020-12-11 | Stop reason: HOSPADM

## 2020-12-11 RX ORDER — INSULIN GLARGINE 100 [IU]/ML
25 INJECTION, SOLUTION SUBCUTANEOUS NIGHTLY
Qty: 5 PEN | Refills: 3 | Status: SHIPPED | OUTPATIENT
Start: 2020-12-11 | End: 2020-12-18 | Stop reason: SDUPTHER

## 2020-12-11 RX ORDER — LANCETS 30 GAUGE
1 EACH MISCELLANEOUS 3 TIMES DAILY
Qty: 200 EACH | Refills: 5 | Status: SHIPPED | OUTPATIENT
Start: 2020-12-11

## 2020-12-11 RX ORDER — BLOOD SUGAR DIAGNOSTIC
1 STRIP MISCELLANEOUS 3 TIMES DAILY
Qty: 100 EACH | Refills: 3 | Status: SHIPPED | OUTPATIENT
Start: 2020-12-11

## 2020-12-11 RX ORDER — ATORVASTATIN CALCIUM 20 MG/1
20 TABLET, FILM COATED ORAL NIGHTLY
Qty: 30 TABLET | Refills: 3 | Status: SHIPPED | OUTPATIENT
Start: 2020-12-11 | End: 2021-03-19 | Stop reason: SDUPTHER

## 2020-12-11 RX ORDER — LISINOPRIL 20 MG/1
20 TABLET ORAL DAILY
Qty: 30 TABLET | Refills: 5 | Status: SHIPPED | OUTPATIENT
Start: 2020-12-11 | End: 2021-03-19 | Stop reason: SDUPTHER

## 2020-12-11 RX ADMIN — INSULIN LISPRO 8 UNITS: 100 INJECTION, SOLUTION INTRAVENOUS; SUBCUTANEOUS at 08:19

## 2020-12-11 RX ADMIN — METFORMIN HYDROCHLORIDE 500 MG: 500 TABLET ORAL at 08:19

## 2020-12-11 RX ADMIN — INSULIN LISPRO 8 UNITS: 100 INJECTION, SOLUTION INTRAVENOUS; SUBCUTANEOUS at 12:15

## 2020-12-11 ASSESSMENT — PAIN DESCRIPTION - PROGRESSION
CLINICAL_PROGRESSION: NOT CHANGED

## 2020-12-11 ASSESSMENT — PAIN SCALES - GENERAL
PAINLEVEL_OUTOF10: 0
PAINLEVEL_OUTOF10: 0

## 2020-12-11 NOTE — CARE COORDINATION
Education    Does the patient have a Primary Care Physician? Yes, Francia Russell MD       Does the patient require new medication instruction? Yes - previous experience with administering insulin to his wife. Person responsible for administration of Insulin/Medication:       [x] Self     [] Caregiver       [] Spouse       [] Other Family Member   []  Other    Insulin Instruction:  insulin pen  Injection Site:   [x] location    [x] rotation     Level of patient/caregiver understanding able to:        [x] Verbalized Understanding   [] Demonstrated Understanding       [] Teach Back       [] Needs Reinforcement     []  Other:        Does the patient/caregiver monitor Blood Glucoses? No: previous experience with his wife. Requests to review with his daughter. Device set up with time/date. Reviewed steps. Reviewed glycemic control targets, testing frequency and when to call PCP. Level of patient/caregiver understanding able to:        [x] Verbalized Understanding   [] Demonstrated Understanding       [] Teach Back       [] Needs Reinforcement     []  Other:        Does the patient/caregiver follow a Meal Plan? No: Adds honey and sugar to tea. Recommend making water, unsweetened tea or coffee primary drinks. Discussed alternatives to sugary drinks. Level of patient/caregiver understanding able to:       [x] Verbalized Understanding   [] Demonstrated Understanding       [] Teach Back       [] Needs Reinforcement     []  Other:        Plan        Ongoing diabetes education and blood glucose monitoring. Awaiting his daughter for discharge.                                              Discharge Plan:  Placement for patient upon discharge: independent living        Teaching Time Diabetes Education:  20 minutes     Electronically signed by Quincy Canales on 12/11/2020 at 3:45 PM

## 2020-12-11 NOTE — PLAN OF CARE
Problem:  Activity:  Goal: Risk for activity intolerance will decrease  Description: Risk for activity intolerance will decrease  12/11/2020 1009 by Anthony Diallo RN  Outcome: Ongoing     Problem: Coping:  Goal: Ability to adjust to condition or change in health will improve  Description: Ability to adjust to condition or change in health will improve  12/11/2020 1009 by Anthony Diallo RN  Outcome: Ongoing     Problem: Fluid Volume:  Goal: Ability to maintain a balanced intake and output will improve  Description: Ability to maintain a balanced intake and output will improve  12/11/2020 1009 by Anthony Diallo RN  Outcome: Ongoing     Problem: Health Behavior:  Goal: Ability to identify and utilize available resources and services will improve  Description: Ability to identify and utilize available resources and services will improve  12/11/2020 1009 by Anthony Diallo RN  Outcome: Ongoing     Problem: Skin Integrity:  Goal: Risk for impaired skin integrity will decrease  Description: Risk for impaired skin integrity will decrease  12/11/2020 1009 by Anthony Diallo RN  Outcome: Ongoing

## 2020-12-11 NOTE — PLAN OF CARE
Problem:  Activity:  Goal: Risk for activity intolerance will decrease  Description: Risk for activity intolerance will decrease  Outcome: Ongoing     Problem: Coping:  Goal: Ability to adjust to condition or change in health will improve  Description: Ability to adjust to condition or change in health will improve  Outcome: Ongoing     Problem: Fluid Volume:  Goal: Ability to maintain a balanced intake and output will improve  Description: Ability to maintain a balanced intake and output will improve  Outcome: Ongoing     Problem: Health Behavior:  Goal: Ability to identify and utilize available resources and services will improve  Description: Ability to identify and utilize available resources and services will improve  Outcome: Ongoing  Goal: Ability to manage health-related needs will improve  Description: Ability to manage health-related needs will improve  Outcome: Ongoing     Problem: Metabolic:  Goal: Ability to maintain appropriate glucose levels will improve  Description: Ability to maintain appropriate glucose levels will improve  Outcome: Ongoing     Problem: Nutritional:  Goal: Maintenance of adequate nutrition will improve  Description: Maintenance of adequate nutrition will improve  Outcome: Ongoing  Goal: Progress toward achieving an optimal weight will improve  Description: Progress toward achieving an optimal weight will improve  Outcome: Ongoing     Problem: Physical Regulation:  Goal: Complications related to the disease process, condition or treatment will be avoided or minimized  Description: Complications related to the disease process, condition or treatment will be avoided or minimized  Outcome: Ongoing  Goal: Diagnostic test results will improve  Description: Diagnostic test results will improve  Outcome: Ongoing     Problem: Skin Integrity:  Goal: Risk for impaired skin integrity will decrease  Description: Risk for impaired skin integrity will decrease  Outcome: Ongoing     Problem: Tissue Perfusion:  Goal: Adequacy of tissue perfusion will improve  Description: Adequacy of tissue perfusion will improve  Outcome: Ongoing

## 2020-12-11 NOTE — PROGRESS NOTES
IV removed. Patient obtained discharge medications and diabetic supplies from retail pharmacy. Huang Villalpando, diabetes educator, and writer instructed patient how to use insulin pen and glucometer. Discharge instructions reviewed with patient and daughter, all questions and concerns addressed. They understand to follow up with Dr Mone Rudolph next week and keep a log of his blood sugars. Patient discharged home ambulatory at this time with his daughter.

## 2020-12-15 NOTE — DISCHARGE SUMMARY
(LIPITOR) 20 MG tablet Take 1 tablet by mouth nightly 30 tablet 3    metFORMIN (GLUCOPHAGE) 500 MG tablet Take 1 tablet by mouth 2 times daily (with meals) 60 tablet 3    insulin glargine (LANTUS SOLOSTAR) 100 UNIT/ML injection pen Inject 25 Units into the skin nightly 5 pen 3    Insulin Pen Needle 32G X 4 MM MISC 1 each by Does not apply route daily 100 each 3    Blood Glucose Monitoring Suppl (ACCU-CHEK SAM CONNECT) w/Device KIT As directed 1 kit 1    blood glucose test strips (ACCU-CHEK SAM PLUS) strip 1 each by In Vitro route 3 times daily As needed.  100 each 3    Lancets MISC 1 each by Does not apply route 3 times daily 200 each 5    Lancets Misc. (ACCU-CHEK MULTICLIX LANCET DEV) KIT As directed 1 kit 0    lisinopril (PRINIVIL;ZESTRIL) 20 MG tablet Take 1 tablet by mouth daily 30 tablet 5       Orders Placed This Encounter   Medications    0.9 % sodium chloride bolus    0.9 % sodium chloride bolus    insulin regular (HUMULIN R;NOVOLIN R) injection 10 Units    DISCONTD: sodium chloride flush 0.9 % injection 10 mL    DISCONTD: sodium chloride flush 0.9 % injection 10 mL    DISCONTD: acetaminophen (TYLENOL) tablet 650 mg    DISCONTD: acetaminophen (TYLENOL) suppository 650 mg    DISCONTD: polyethylene glycol (GLYCOLAX) packet 17 g    DISCONTD: promethazine (PHENERGAN) tablet 12.5 mg    DISCONTD: ondansetron (ZOFRAN) injection 4 mg    DISCONTD: enoxaparin (LOVENOX) injection 40 mg    DISCONTD: 0.9 % sodium chloride infusion    DISCONTD: insulin lispro (HUMALOG) injection vial 0-6 Units    DISCONTD: insulin lispro (HUMALOG) injection vial 0-3 Units    insulin glargine (LANTUS) injection vial 18 Units    DISCONTD: metFORMIN (GLUCOPHAGE) tablet 500 mg    DISCONTD: atorvastatin (LIPITOR) tablet 20 mg    DISCONTD: glucose (GLUTOSE) 40 % oral gel 15 g    DISCONTD: dextrose 50 % IV solution    DISCONTD: glucagon (rDNA) injection 1 mg    DISCONTD: dextrose 5 % solution    DISCONTD: insulin lispro (HUMALOG) injection vial 0-12 Units    DISCONTD: insulin lispro (HUMALOG) injection vial 0-6 Units    atorvastatin (LIPITOR) 20 MG tablet     Sig: Take 1 tablet by mouth nightly     Dispense:  30 tablet     Refill:  3    metFORMIN (GLUCOPHAGE) 500 MG tablet     Sig: Take 1 tablet by mouth 2 times daily (with meals)     Dispense:  60 tablet     Refill:  3    insulin glargine (LANTUS SOLOSTAR) 100 UNIT/ML injection pen     Sig: Inject 25 Units into the skin nightly     Dispense:  5 pen     Refill:  3    Insulin Pen Needle 32G X 4 MM MISC     Si each by Does not apply route daily     Dispense:  100 each     Refill:  3    Blood Glucose Monitoring Suppl (ACCU-CHEK SAM CONNECT) w/Device KIT     Sig: As directed     Dispense:  1 kit     Refill:  1    blood glucose test strips (ACCU-CHEK SAM PLUS) strip     Si each by In Vitro route 3 times daily As needed. Dispense:  100 each     Refill:  3    Lancets MISC     Si each by Does not apply route 3 times daily     Dispense:  200 each     Refill:  5    Lancets Misc. (ACCU-CHEK MULTICLIX LANCET DEV) KIT     Sig: As directed     Dispense:  1 kit     Refill:  0    lisinopril (PRINIVIL;ZESTRIL) 20 MG tablet     Sig: Take 1 tablet by mouth daily     Dispense:  30 tablet     Refill:  5           Objective:   Vitals: BP (!) 153/82   Pulse 80   Temp 98.6 °F (37 °C) (Oral)   Resp 16   Ht 5' 7\" (1.702 m)   Wt 236 lb 15.9 oz (107.5 kg)   SpO2 93%   BMI 37.12 kg/m²   General appearance: alert,awake,oriented x 3 and cooperative with exam  HEENT: Head: Normal, normocephalic, atraumatic, anicteric, pupils are reactive to light. Dry mucous membrane.   Neck: no adenopathy, no carotid bruit, supple, symmetrical, trachea midline and thyroid not enlarged, symmetric, no tenderness/mass/nodules  Lungs: clear  Heart: regular rate and rhythm, S1, S2 normal, no murmur, click, rub or gallop  Abdomen: soft, non-tender; bowel sounds normal; no masses,  no organomegaly  Extremities: extremities normal, Neurologic: Mental status: Alert, oriented, thought content appropriate    Assessment and Plan:   New onset dm- uncontrolled  HTN  High chol  Patient Active Problem List:     DM hyperosmolarity type II, uncontrolled ([address]     Diabetes mellitus, new onset ([address]     Lightheadedness     High cholesterol    Pt is stable. Discussed with staff and pt about the plan. See the orders for further plan. Will proceed further acc to pt's progress. Time spent with management of the pt is- 20 mts  Follow up in office in 1 wk. See the [address] and [facility] rec forms  Follow up with specialists as instructed by them. Advised the pt to call me in case of questions or worsening of symptoms. Pt voiced understanding of the instructions.   Condition and prognosis is guarded      Electronically signed by: Leslie Douglas MD, on 12/14/2020, at 9:50 PM

## 2021-11-22 ENCOUNTER — HOSPITAL ENCOUNTER (INPATIENT)
Age: 61
LOS: 3 days | Discharge: HOME | DRG: 177 | End: 2021-11-25
Attending: EMERGENCY MEDICINE | Admitting: INTERNAL MEDICINE
Payer: COMMERCIAL

## 2021-11-22 ENCOUNTER — APPOINTMENT (OUTPATIENT)
Dept: GENERAL RADIOLOGY | Age: 61
DRG: 177 | End: 2021-11-22
Payer: COMMERCIAL

## 2021-11-22 DIAGNOSIS — E78.00 HIGH CHOLESTEROL: ICD-10-CM

## 2021-11-22 DIAGNOSIS — I10 HYPERTENSION, UNSPECIFIED TYPE: ICD-10-CM

## 2021-11-22 DIAGNOSIS — J18.9 MULTIFOCAL PNEUMONIA: ICD-10-CM

## 2021-11-22 DIAGNOSIS — Z20.822 SUSPECTED COVID-19 VIRUS INFECTION: ICD-10-CM

## 2021-11-22 DIAGNOSIS — J96.01 ACUTE HYPOXEMIC RESPIRATORY FAILURE (HCC): Primary | ICD-10-CM

## 2021-11-22 DIAGNOSIS — E11.00 DM HYPEROSMOLARITY TYPE II, UNCONTROLLED (HCC): ICD-10-CM

## 2021-11-22 DIAGNOSIS — E11.65 DM HYPEROSMOLARITY TYPE II, UNCONTROLLED (HCC): ICD-10-CM

## 2021-11-22 PROBLEM — U07.1 PNEUMONIA DUE TO COVID-19 VIRUS: Status: ACTIVE | Noted: 2021-11-22

## 2021-11-22 LAB
A/G RATIO: 0.7 (ref 1.1–2.2)
ALBUMIN SERPL-MCNC: 3.5 G/DL (ref 3.4–5)
ALP BLD-CCNC: 140 U/L (ref 40–129)
ALT SERPL-CCNC: 34 U/L (ref 10–40)
ANION GAP SERPL CALCULATED.3IONS-SCNC: 15 MMOL/L (ref 3–16)
AST SERPL-CCNC: 47 U/L (ref 15–37)
BASOPHILS ABSOLUTE: 0 K/UL (ref 0–0.2)
BASOPHILS RELATIVE PERCENT: 0.3 %
BILIRUB SERPL-MCNC: 0.7 MG/DL (ref 0–1)
BUN BLDV-MCNC: 12 MG/DL (ref 7–20)
CALCIUM SERPL-MCNC: 9.1 MG/DL (ref 8.3–10.6)
CHLORIDE BLD-SCNC: 92 MMOL/L (ref 99–110)
CO2: 26 MMOL/L (ref 21–32)
CREAT SERPL-MCNC: 1.2 MG/DL (ref 0.8–1.3)
EOSINOPHILS ABSOLUTE: 0 K/UL (ref 0–0.6)
EOSINOPHILS RELATIVE PERCENT: 0.2 %
GFR AFRICAN AMERICAN: >60
GFR NON-AFRICAN AMERICAN: >60
GLUCOSE BLD-MCNC: 198 MG/DL (ref 70–99)
HCT VFR BLD CALC: 41.5 % (ref 40.5–52.5)
HEMOGLOBIN: 13.7 G/DL (ref 13.5–17.5)
LACTIC ACID, SEPSIS: 1.5 MMOL/L (ref 0.4–1.9)
LYMPHOCYTES ABSOLUTE: 1.2 K/UL (ref 1–5.1)
LYMPHOCYTES RELATIVE PERCENT: 16.3 %
MCH RBC QN AUTO: 27.5 PG (ref 26–34)
MCHC RBC AUTO-ENTMCNC: 33.1 G/DL (ref 31–36)
MCV RBC AUTO: 83.2 FL (ref 80–100)
MONOCYTES ABSOLUTE: 0.7 K/UL (ref 0–1.3)
MONOCYTES RELATIVE PERCENT: 9.1 %
NEUTROPHILS ABSOLUTE: 5.7 K/UL (ref 1.7–7.7)
NEUTROPHILS RELATIVE PERCENT: 74.1 %
PDW BLD-RTO: 14.2 % (ref 12.4–15.4)
PLATELET # BLD: 312 K/UL (ref 135–450)
PMV BLD AUTO: 8.3 FL (ref 5–10.5)
POTASSIUM SERPL-SCNC: 4 MMOL/L (ref 3.5–5.1)
PRO-BNP: 40 PG/ML (ref 0–124)
PROCALCITONIN: 0.15 NG/ML (ref 0–0.15)
RAPID INFLUENZA  B AGN: NEGATIVE
RAPID INFLUENZA A AGN: NEGATIVE
RBC # BLD: 4.99 M/UL (ref 4.2–5.9)
SODIUM BLD-SCNC: 133 MMOL/L (ref 136–145)
TOTAL PROTEIN: 8.2 G/DL (ref 6.4–8.2)
TROPONIN: <0.01 NG/ML
WBC # BLD: 7.6 K/UL (ref 4–11)

## 2021-11-22 PROCEDURE — 87804 INFLUENZA ASSAY W/OPTIC: CPT

## 2021-11-22 PROCEDURE — 85025 COMPLETE CBC W/AUTO DIFF WBC: CPT

## 2021-11-22 PROCEDURE — 99285 EMERGENCY DEPT VISIT HI MDM: CPT

## 2021-11-22 PROCEDURE — 83605 ASSAY OF LACTIC ACID: CPT

## 2021-11-22 PROCEDURE — 84145 PROCALCITONIN (PCT): CPT

## 2021-11-22 PROCEDURE — 1200000000 HC SEMI PRIVATE

## 2021-11-22 PROCEDURE — 84484 ASSAY OF TROPONIN QUANT: CPT

## 2021-11-22 PROCEDURE — 2580000003 HC RX 258: Performed by: NURSE PRACTITIONER

## 2021-11-22 PROCEDURE — 94761 N-INVAS EAR/PLS OXIMETRY MLT: CPT

## 2021-11-22 PROCEDURE — 96374 THER/PROPH/DIAG INJ IV PUSH: CPT

## 2021-11-22 PROCEDURE — 6360000002 HC RX W HCPCS: Performed by: NURSE PRACTITIONER

## 2021-11-22 PROCEDURE — 87040 BLOOD CULTURE FOR BACTERIA: CPT

## 2021-11-22 PROCEDURE — 96361 HYDRATE IV INFUSION ADD-ON: CPT

## 2021-11-22 PROCEDURE — U0005 INFEC AGEN DETEC AMPLI PROBE: HCPCS

## 2021-11-22 PROCEDURE — 2700000000 HC OXYGEN THERAPY PER DAY

## 2021-11-22 PROCEDURE — 83880 ASSAY OF NATRIURETIC PEPTIDE: CPT

## 2021-11-22 PROCEDURE — 80053 COMPREHEN METABOLIC PANEL: CPT

## 2021-11-22 PROCEDURE — 71045 X-RAY EXAM CHEST 1 VIEW: CPT

## 2021-11-22 PROCEDURE — 36415 COLL VENOUS BLD VENIPUNCTURE: CPT

## 2021-11-22 PROCEDURE — 93005 ELECTROCARDIOGRAM TRACING: CPT | Performed by: NURSE PRACTITIONER

## 2021-11-22 PROCEDURE — U0003 INFECTIOUS AGENT DETECTION BY NUCLEIC ACID (DNA OR RNA); SEVERE ACUTE RESPIRATORY SYNDROME CORONAVIRUS 2 (SARS-COV-2) (CORONAVIRUS DISEASE [COVID-19]), AMPLIFIED PROBE TECHNIQUE, MAKING USE OF HIGH THROUGHPUT TECHNOLOGIES AS DESCRIBED BY CMS-2020-01-R: HCPCS

## 2021-11-22 RX ORDER — DEXAMETHASONE SODIUM PHOSPHATE 4 MG/ML
6 INJECTION, SOLUTION INTRA-ARTICULAR; INTRALESIONAL; INTRAMUSCULAR; INTRAVENOUS; SOFT TISSUE ONCE
Status: COMPLETED | OUTPATIENT
Start: 2021-11-22 | End: 2021-11-22

## 2021-11-22 RX ORDER — SODIUM CHLORIDE, SODIUM LACTATE, POTASSIUM CHLORIDE, CALCIUM CHLORIDE 600; 310; 30; 20 MG/100ML; MG/100ML; MG/100ML; MG/100ML
1000 INJECTION, SOLUTION INTRAVENOUS ONCE
Status: COMPLETED | OUTPATIENT
Start: 2021-11-22 | End: 2021-11-22

## 2021-11-22 RX ADMIN — DEXAMETHASONE SODIUM PHOSPHATE 6 MG: 4 INJECTION, SOLUTION INTRAMUSCULAR; INTRAVENOUS at 21:00

## 2021-11-22 RX ADMIN — SODIUM CHLORIDE, POTASSIUM CHLORIDE, SODIUM LACTATE AND CALCIUM CHLORIDE 1000 ML: 600; 310; 30; 20 INJECTION, SOLUTION INTRAVENOUS at 20:28

## 2021-11-22 ASSESSMENT — PAIN SCALES - GENERAL: PAINLEVEL_OUTOF10: 3

## 2021-11-22 ASSESSMENT — PAIN DESCRIPTION - LOCATION: LOCATION: GENERALIZED

## 2021-11-22 ASSESSMENT — ENCOUNTER SYMPTOMS
CHEST TIGHTNESS: 0
COUGH: 1
VOMITING: 0
SHORTNESS OF BREATH: 1
ABDOMINAL PAIN: 0
DIARRHEA: 0

## 2021-11-22 ASSESSMENT — PAIN DESCRIPTION - PAIN TYPE: TYPE: ACUTE PAIN

## 2021-11-22 NOTE — ED PROVIDER NOTES
[facility]        Pt Name: Juan Luis Wheeler  MRN: [id number]  [place] 1960  Date of evaluation: 11/22/2021  Provider: YOUNG Paez CNP  PCP: Digna Jorge MD  Note Started: 6:44 PM EST        I have seen and evaluated this patient with my supervising physician Malissa Bob, *. CHIEF COMPLAINT       Chief Complaint   Patient presents with    Generalized Body Aches     Patient in with complaints of feeling ill for four days. States he doesnt feel like eating, body aches, weakness, fevers, chills. Denies being around anyone with covid. HISTORY OF PRESENT ILLNESS   (Location, Timing/Onset, Context/Setting, Quality, Duration, Modifying Factors, Severity, Associated Signs and Symptoms)  Note limiting factors. Chief Complaint: feeling unwell, unable to eat, cough, chills     Juan Luis Wheeler is a 57 y.o. male who presents to the emergency department feeling unwell since Friday. He reports chills, body aches, headache, cough. States that he is spitting up mucus. Reports that he is unable to eat and is very fatigued. Having difficulty caring for himself at home. He is unvaccinated from Covid, has not seen his primary care doctor for this problem since time of onset 4 days ago. Patient does appear unwell. Denies any visual disturbances. No chest pain or pressure. No neck or back pain. No abdominal pain, nausea, vomiting, diarrhea, constipation, or dysuria. No rash. Nursing Notes were all reviewed and agreed with or any disagreements were addressed in the HPI. REVIEW OF SYSTEMS    (2-9 systems for level 4, 10 or more for level 5)     Review of Systems   Constitutional: Positive for chills, fatigue and fever. Negative for activity change. Respiratory: Positive for cough and shortness of breath. Negative for chest tightness. Cardiovascular: Negative for chest pain. Gastrointestinal: Negative for abdominal pain, diarrhea and vomiting. Genitourinary: Negative for dysuria. Musculoskeletal: Positive for arthralgias and myalgias. All other systems reviewed and are negative. Positives and Pertinent negatives as per HPI. Except as noted above in the ROS, all other systems were reviewed and negative. PAST MEDICAL HISTORY     Past Medical History:   Diagnosis Date    Essential hypertension     Hyperlipidemia     Uncontrolled type 2 diabetes mellitus with hyperglycemia ([address]          SURGICAL HISTORY   No past surgical history on file. CURRENTMEDICATIONS       Previous Medications    ATORVASTATIN (LIPITOR) 20 MG TABLET    Take 1 tablet by mouth nightly    BLOOD GLUCOSE MONITORING SUPPL (ACCU-CHEK SAM CONNECT) W/DEVICE KIT    As directed    BLOOD GLUCOSE TEST STRIPS (ACCU-CHEK SAM PLUS) STRIP    1 each by In Vitro route 3 times daily As needed. INSULIN GLARGINE (LANTUS SOLOSTAR) 100 UNIT/ML INJECTION PEN    Inject 35 Units into the skin nightly    INSULIN PEN NEEDLE 32G X 4 MM MISC    1 each by Does not apply route daily    LANCETS MISC    1 each by Does not apply route 3 times daily    LANCETS MISC. (ACCU-CHEK MULTICLIX LANCET DEV) KIT    As directed    LISINOPRIL (PRINIVIL;ZESTRIL) 20 MG TABLET    Take 1 tablet by mouth daily    METFORMIN (GLUCOPHAGE) 500 MG TABLET    Take 1 tablet by mouth 2 times daily (with meals)         ALLERGIES     Patient has no known allergies.     FAMILYHISTORY       Family History   Family history unknown: Yes          SOCIAL HISTORY       Social History     Tobacco Use    Smoking status: Never Smoker    Smokeless tobacco: Never Used   Substance Use Topics    Alcohol use: Not on file    Drug use: Not on file       SCREENINGS             PHYSICAL EXAM    (up to 7 for level 4, 8 or more for level 5)     ED Triage Vitals [11/22/21 1822]   BP Temp Temp src Pulse Resp SpO2 Height Weight   (!) 169/95 99.9 °F (37.7 °C) -- 104 18 92 % -- --       Physical Exam  Vitals and nursing note reviewed. Constitutional:       Appearance: He is well-developed. He is ill-appearing. He is not diaphoretic. HENT:      Head: Normocephalic and atraumatic. Right Ear: External ear normal.      Left Ear: External ear normal.   Eyes:      General:         Right eye: No discharge. Left eye: No discharge. Neck:      Vascular: No JVD. Cardiovascular:      Rate and Rhythm: Tachycardia present. Pulses: Normal pulses. Heart sounds: Normal heart sounds. Pulmonary:      Effort: Pulmonary effort is normal. No respiratory distress. Breath sounds: Normal breath sounds. Abdominal:      Palpations: Abdomen is soft. Musculoskeletal:         General: Normal range of motion. Cervical back: Normal range of motion and neck supple. Skin:     General: Skin is warm and dry. Coloration: Skin is not pale. Neurological:      Mental Status: He is alert and oriented to person, place, and time.    Psychiatric:         Behavior: Behavior normal.         DIAGNOSTIC RESULTS   LABS:    Labs Reviewed   COMPREHENSIVE METABOLIC PANEL - Abnormal; Notable for the following components:       Result Value    Sodium 133 (*)     Chloride 92 (*)     Glucose 198 (*)     Albumin/Globulin Ratio 0.7 (*)     Alkaline Phosphatase 140 (*)     AST 47 (*)     All other components within normal limits    Narrative:     Performed at:  [facility]  [address] [facility] [employer]   Phone [phone]   RAPID INFLUENZA A/B ANTIGENS    Narrative:     Performed at:  [facility]  [address] [employer]   Phone [phone]   CULTURE, BLOOD 1   CULTURE, BLOOD 2   CBC WITH AUTO DIFFERENTIAL    Narrative:     Performed at:  [facility]  [address] [place] [place]   Phone [phone]   LACTATE, SEPSIS    Narrative:     Performed at:  [facility]  [address] [place]   Phone [phone]   TROPONIN    Narrative:     Performed at:  [facility]  [address] [facility] [address]   Phone [phone]   BRAIN NATRIURETIC PEPTIDE    Narrative:     Performed at:  [facility]  [address] [facility],  [place], [address]   Phone [phone]   PROCALCITONIN    Narrative:     Performed at:  [facility]  [address] [facility],  [place], [address]   Phone [phone]       When ordered only abnormal lab results are displayed. All other labs were within normal range or not returned as of this dictation. EKG: When ordered, EKG's are interpreted by the Emergency Department Physician in the absence of a cardiologist.  Please see their note for interpretation of EKG. RADIOLOGY:   Non-plain film images such as CT, Ultrasound and MRI are read by the radiologist. Plain radiographic images are visualized and preliminarily interpreted by the ED Provider with the below findings:        Interpretation per the Radiologist below, if available at the time of this note:    XR CHEST PORTABLE   Final Result   Mild patchy/streaky bibasilar airspace opacities, right worse than left,   concerning for multifocal pneumonia with atypical/viral pneumonia in the   differential.           No results found. PROCEDURES   Unless otherwise noted below, none     Procedures    CRITICAL CARE TIME   The total critical care time spent while evaluating and treating this patient was at least 32 minutes. This excludes time spent doing separately billable procedures. This includes time at the bedside, data interpretation, medication management, obtaining critical history from collateral sources if the patient is unable to provide it directly, and physician consultation.   Specifics of interventions taken and potentially life-threatening diagnostic considerations are listed above in the medical decision making. CONSULTS:  IP CONSULT TO HOSPITALIST  IP CONSULT TO PALLIATIVE CARE      EMERGENCY DEPARTMENT COURSE and DIFFERENTIAL DIAGNOSIS/MDM:   Vitals:    Vitals:    11/22/21 2045 11/22/21 2100 11/22/21 2115 11/22/21 2130   BP: (!) 163/93 (!) 166/85 (!) 155/89 (!) 165/96   Pulse: 93 93 93 89   Resp: 22 27 26 26   Temp:       SpO2: 94% 93% 94% 95%       Patient was given the following medications:  Medications   lactated ringers infusion 1,000 mL (0 mLs IntraVENous Stopped 11/22/21 2144)   dexamethasone (DECADRON) injection 6 mg (6 mg IntraVENous Given 11/22/21 2100)           Briefly, this is a 68-year-old male who presents to the emergency department with symptoms including chills, body aches, headache and fever. Unable to eat very fatigued. He is coughing. Unvaccinated from [address] Lactated Ringer's given IV. The patient was hypoxic with ambulation, dropped to 88%. Placed on 2 L of oxygen and given 6 mg of Decadron. CBC is unremarkable. CMP unremarkable. Lactate 1.5. Troponin negative. BNP 40. Procalcitonin 1.15. Rapid influenza is negative. XR CHEST PORTABLE (Final result)  Result time 11/22/21 20:46:43  Final result by Racheal Jim MD (11/22/21 20:46:43)                Impression:    Mild patchy/streaky bibasilar airspace opacities, right worse than left,   concerning for multifocal pneumonia with atypical/viral pneumonia in the   differential.               Patient will be admitted for acute respiratory failure with hypoxia, suspected Covid. Admitted to hospitalist services. Updated regarding plan of care. FINAL IMPRESSION      1. Acute hypoxemic respiratory failure (HCC)    2. Suspected COVID-19 virus infection    3. Multifocal pneumonia          DISPOSITION/PLAN   DISPOSITION Admitted 11/22/2021 09:47:29 PM      PATIENT REFERRED TO:  No follow-up provider specified.     DISCHARGE MEDICATIONS:  New Prescriptions    No medications on file       DISCONTINUED MEDICATIONS:  Discontinued Medications    No medications on file              (Please note that portions of this note were completed with a voice recognition program.  Efforts were made to edit the dictations but occasionally words are mis-transcribed.)    YOUNG Connelly CNP (electronically signed)           YOUNG Connelly CNP  11/22/21 2100       YOUNG Connelly CNP  11/22/21 0662

## 2021-11-22 NOTE — LETTER
NOTIFICATION RETURN TO WORK / SCHOOL    11/29/2021    Mr. Mohini Lawrence  [address] [place]    To Whom It May Concern:    Mohini Lawrence was tested for COVID-19 on 11/23/2021, and the result was positive. This patient was admitted 11/22/2021-11/25/2021. He may return to work 10-14days after positive result, or per company policy. I recommend:return without restrictions    If there are questions or concerns, please have the patient contact our office.         Sincerely,      Satnam Murcia RN

## 2021-11-23 LAB
A/G RATIO: 0.9 (ref 1.1–2.2)
ALBUMIN SERPL-MCNC: 3.7 G/DL (ref 3.4–5)
ALP BLD-CCNC: 145 U/L (ref 40–129)
ALT SERPL-CCNC: 42 U/L (ref 10–40)
ANION GAP SERPL CALCULATED.3IONS-SCNC: 13 MMOL/L (ref 3–16)
APTT: 36.8 SEC (ref 26.2–38.6)
AST SERPL-CCNC: 53 U/L (ref 15–37)
BASOPHILS ABSOLUTE: 0 K/UL (ref 0–0.2)
BASOPHILS RELATIVE PERCENT: 0.2 %
BILIRUB SERPL-MCNC: 0.6 MG/DL (ref 0–1)
BUN BLDV-MCNC: 13 MG/DL (ref 7–20)
C-REACTIVE PROTEIN: 142.1 MG/L (ref 0–5.1)
CALCIUM SERPL-MCNC: 9.2 MG/DL (ref 8.3–10.6)
CHLORIDE BLD-SCNC: 97 MMOL/L (ref 99–110)
CO2: 25 MMOL/L (ref 21–32)
CREAT SERPL-MCNC: 1 MG/DL (ref 0.8–1.3)
D DIMER: 1258 NG/ML DDU (ref 0–229)
EOSINOPHILS ABSOLUTE: 0 K/UL (ref 0–0.6)
EOSINOPHILS RELATIVE PERCENT: 0 %
FERRITIN: 1599 NG/ML (ref 30–400)
FIBRINOGEN: 862 MG/DL (ref 200–397)
GFR AFRICAN AMERICAN: >60
GFR NON-AFRICAN AMERICAN: >60
GLUCOSE BLD-MCNC: 234 MG/DL (ref 70–99)
GLUCOSE BLD-MCNC: 262 MG/DL (ref 70–99)
GLUCOSE BLD-MCNC: 287 MG/DL (ref 70–99)
GLUCOSE BLD-MCNC: 306 MG/DL (ref 70–99)
GLUCOSE BLD-MCNC: 322 MG/DL (ref 70–99)
GLUCOSE BLD-MCNC: 327 MG/DL (ref 70–99)
HCT VFR BLD CALC: 38.4 % (ref 40.5–52.5)
HEMOGLOBIN: 12.9 G/DL (ref 13.5–17.5)
INR BLD: 1.21 (ref 0.88–1.12)
LACTATE DEHYDROGENASE: 469 U/L (ref 100–190)
LACTIC ACID: 1.1 MMOL/L (ref 0.4–2)
LYMPHOCYTES ABSOLUTE: 0.6 K/UL (ref 1–5.1)
LYMPHOCYTES RELATIVE PERCENT: 10 %
MAGNESIUM: 2.3 MG/DL (ref 1.8–2.4)
MCH RBC QN AUTO: 27.6 PG (ref 26–34)
MCHC RBC AUTO-ENTMCNC: 33.6 G/DL (ref 31–36)
MCV RBC AUTO: 82 FL (ref 80–100)
MONOCYTES ABSOLUTE: 0.3 K/UL (ref 0–1.3)
MONOCYTES RELATIVE PERCENT: 6.1 %
NEUTROPHILS ABSOLUTE: 4.7 K/UL (ref 1.7–7.7)
NEUTROPHILS RELATIVE PERCENT: 83.7 %
PDW BLD-RTO: 14.5 % (ref 12.4–15.4)
PERFORMED ON: ABNORMAL
PHOSPHORUS: 4.1 MG/DL (ref 2.5–4.9)
PLATELET # BLD: 345 K/UL (ref 135–450)
PMV BLD AUTO: 8.8 FL (ref 5–10.5)
POTASSIUM SERPL-SCNC: 4.4 MMOL/L (ref 3.5–5.1)
PROCALCITONIN: 0.12 NG/ML (ref 0–0.15)
PROTHROMBIN TIME: 13.8 SEC (ref 9.9–12.7)
RBC # BLD: 4.68 M/UL (ref 4.2–5.9)
SARS-COV-2: DETECTED
SODIUM BLD-SCNC: 135 MMOL/L (ref 136–145)
TOTAL PROTEIN: 7.9 G/DL (ref 6.4–8.2)
TROPONIN: <0.01 NG/ML
VITAMIN D 25-HYDROXY: 20.2 NG/ML
WBC # BLD: 5.7 K/UL (ref 4–11)

## 2021-11-23 PROCEDURE — 2580000003 HC RX 258: Performed by: HOSPITALIST

## 2021-11-23 PROCEDURE — 83615 LACTATE (LD) (LDH) ENZYME: CPT

## 2021-11-23 PROCEDURE — 6370000000 HC RX 637 (ALT 250 FOR IP): Performed by: NURSE PRACTITIONER

## 2021-11-23 PROCEDURE — 6370000000 HC RX 637 (ALT 250 FOR IP): Performed by: INTERNAL MEDICINE

## 2021-11-23 PROCEDURE — 83735 ASSAY OF MAGNESIUM: CPT

## 2021-11-23 PROCEDURE — 84484 ASSAY OF TROPONIN QUANT: CPT

## 2021-11-23 PROCEDURE — 2500000003 HC RX 250 WO HCPCS: Performed by: HOSPITALIST

## 2021-11-23 PROCEDURE — 94640 AIRWAY INHALATION TREATMENT: CPT

## 2021-11-23 PROCEDURE — 94761 N-INVAS EAR/PLS OXIMETRY MLT: CPT

## 2021-11-23 PROCEDURE — 6360000002 HC RX W HCPCS: Performed by: HOSPITALIST

## 2021-11-23 PROCEDURE — 87449 NOS EACH ORGANISM AG IA: CPT

## 2021-11-23 PROCEDURE — 85379 FIBRIN DEGRADATION QUANT: CPT

## 2021-11-23 PROCEDURE — 83036 HEMOGLOBIN GLYCOSYLATED A1C: CPT

## 2021-11-23 PROCEDURE — 1200000000 HC SEMI PRIVATE

## 2021-11-23 PROCEDURE — XW033E5 INTRODUCTION OF REMDESIVIR ANTI-INFECTIVE INTO PERIPHERAL VEIN, PERCUTANEOUS APPROACH, NEW TECHNOLOGY GROUP 5: ICD-10-PCS | Performed by: HOSPITALIST

## 2021-11-23 PROCEDURE — 82728 ASSAY OF FERRITIN: CPT

## 2021-11-23 PROCEDURE — 85610 PROTHROMBIN TIME: CPT

## 2021-11-23 PROCEDURE — 82306 VITAMIN D 25 HYDROXY: CPT

## 2021-11-23 PROCEDURE — 2580000003 HC RX 258: Performed by: INTERNAL MEDICINE

## 2021-11-23 PROCEDURE — 85025 COMPLETE CBC W/AUTO DIFF WBC: CPT

## 2021-11-23 PROCEDURE — 6360000002 HC RX W HCPCS: Performed by: INTERNAL MEDICINE

## 2021-11-23 PROCEDURE — 86140 C-REACTIVE PROTEIN: CPT

## 2021-11-23 PROCEDURE — XW033H5 INTRODUCTION OF TOCILIZUMAB INTO PERIPHERAL VEIN, PERCUTANEOUS APPROACH, NEW TECHNOLOGY GROUP 5: ICD-10-PCS | Performed by: HOSPITALIST

## 2021-11-23 PROCEDURE — 80053 COMPREHEN METABOLIC PANEL: CPT

## 2021-11-23 PROCEDURE — 84100 ASSAY OF PHOSPHORUS: CPT

## 2021-11-23 PROCEDURE — 83605 ASSAY OF LACTIC ACID: CPT

## 2021-11-23 PROCEDURE — 85384 FIBRINOGEN ACTIVITY: CPT

## 2021-11-23 PROCEDURE — 84145 PROCALCITONIN (PCT): CPT

## 2021-11-23 PROCEDURE — 2700000000 HC OXYGEN THERAPY PER DAY

## 2021-11-23 PROCEDURE — 85730 THROMBOPLASTIN TIME PARTIAL: CPT

## 2021-11-23 RX ORDER — DEXAMETHASONE SODIUM PHOSPHATE 10 MG/ML
6 INJECTION, SOLUTION INTRAMUSCULAR; INTRAVENOUS DAILY
Status: DISCONTINUED | OUTPATIENT
Start: 2021-11-23 | End: 2021-11-23

## 2021-11-23 RX ORDER — GUAIFENESIN/DEXTROMETHORPHAN 100-10MG/5
5 SYRUP ORAL EVERY 4 HOURS PRN
Status: DISCONTINUED | OUTPATIENT
Start: 2021-11-23 | End: 2021-11-25 | Stop reason: HOSPADM

## 2021-11-23 RX ORDER — ALBUTEROL SULFATE 90 UG/1
2 AEROSOL, METERED RESPIRATORY (INHALATION) 2 TIMES DAILY
Status: DISCONTINUED | OUTPATIENT
Start: 2021-11-23 | End: 2021-11-25 | Stop reason: HOSPADM

## 2021-11-23 RX ORDER — INSULIN LISPRO 100 [IU]/ML
0-18 INJECTION, SOLUTION INTRAVENOUS; SUBCUTANEOUS
Status: DISCONTINUED | OUTPATIENT
Start: 2021-11-23 | End: 2021-11-25 | Stop reason: HOSPADM

## 2021-11-23 RX ORDER — LISINOPRIL 20 MG/1
20 TABLET ORAL DAILY
Status: DISCONTINUED | OUTPATIENT
Start: 2021-11-23 | End: 2021-11-25 | Stop reason: HOSPADM

## 2021-11-23 RX ORDER — MAGNESIUM SULFATE 1 G/100ML
1000 INJECTION INTRAVENOUS PRN
Status: DISCONTINUED | OUTPATIENT
Start: 2021-11-23 | End: 2021-11-25 | Stop reason: HOSPADM

## 2021-11-23 RX ORDER — VITAMIN B COMPLEX
2000 TABLET ORAL DAILY
Status: DISCONTINUED | OUTPATIENT
Start: 2021-11-23 | End: 2021-11-25 | Stop reason: HOSPADM

## 2021-11-23 RX ORDER — SODIUM CHLORIDE 0.9 % (FLUSH) 0.9 %
5-40 SYRINGE (ML) INJECTION EVERY 12 HOURS SCHEDULED
Status: DISCONTINUED | OUTPATIENT
Start: 2021-11-23 | End: 2021-11-25 | Stop reason: HOSPADM

## 2021-11-23 RX ORDER — NICOTINE POLACRILEX 4 MG
15 LOZENGE BUCCAL PRN
Status: DISCONTINUED | OUTPATIENT
Start: 2021-11-23 | End: 2021-11-25 | Stop reason: HOSPADM

## 2021-11-23 RX ORDER — POTASSIUM CHLORIDE 20 MEQ/1
40 TABLET, EXTENDED RELEASE ORAL PRN
Status: DISCONTINUED | OUTPATIENT
Start: 2021-11-23 | End: 2021-11-25 | Stop reason: HOSPADM

## 2021-11-23 RX ORDER — DEXTROSE MONOHYDRATE 50 MG/ML
100 INJECTION, SOLUTION INTRAVENOUS PRN
Status: DISCONTINUED | OUTPATIENT
Start: 2021-11-23 | End: 2021-11-25 | Stop reason: HOSPADM

## 2021-11-23 RX ORDER — SODIUM CHLORIDE 9 MG/ML
25 INJECTION, SOLUTION INTRAVENOUS PRN
Status: DISCONTINUED | OUTPATIENT
Start: 2021-11-23 | End: 2021-11-25 | Stop reason: HOSPADM

## 2021-11-23 RX ORDER — ALBUTEROL SULFATE 90 UG/1
2 AEROSOL, METERED RESPIRATORY (INHALATION) EVERY 4 HOURS PRN
Status: DISCONTINUED | OUTPATIENT
Start: 2021-11-23 | End: 2021-11-25 | Stop reason: HOSPADM

## 2021-11-23 RX ORDER — SODIUM CHLORIDE 0.9 % (FLUSH) 0.9 %
5-40 SYRINGE (ML) INJECTION PRN
Status: DISCONTINUED | OUTPATIENT
Start: 2021-11-23 | End: 2021-11-25 | Stop reason: HOSPADM

## 2021-11-23 RX ORDER — ACETAMINOPHEN 650 MG/1
650 SUPPOSITORY RECTAL EVERY 6 HOURS PRN
Status: DISCONTINUED | OUTPATIENT
Start: 2021-11-23 | End: 2021-11-25 | Stop reason: HOSPADM

## 2021-11-23 RX ORDER — POLYETHYLENE GLYCOL 3350 17 G/17G
17 POWDER, FOR SOLUTION ORAL DAILY PRN
Status: DISCONTINUED | OUTPATIENT
Start: 2021-11-23 | End: 2021-11-25 | Stop reason: HOSPADM

## 2021-11-23 RX ORDER — INSULIN LISPRO 100 [IU]/ML
0-9 INJECTION, SOLUTION INTRAVENOUS; SUBCUTANEOUS NIGHTLY
Status: DISCONTINUED | OUTPATIENT
Start: 2021-11-23 | End: 2021-11-25 | Stop reason: HOSPADM

## 2021-11-23 RX ORDER — POTASSIUM CHLORIDE 7.45 MG/ML
10 INJECTION INTRAVENOUS PRN
Status: DISCONTINUED | OUTPATIENT
Start: 2021-11-23 | End: 2021-11-25 | Stop reason: HOSPADM

## 2021-11-23 RX ORDER — ONDANSETRON 2 MG/ML
4 INJECTION INTRAMUSCULAR; INTRAVENOUS EVERY 6 HOURS PRN
Status: DISCONTINUED | OUTPATIENT
Start: 2021-11-23 | End: 2021-11-25 | Stop reason: HOSPADM

## 2021-11-23 RX ORDER — SODIUM CHLORIDE 9 MG/ML
INJECTION, SOLUTION INTRAVENOUS
Status: DISPENSED
Start: 2021-11-23 | End: 2021-11-24

## 2021-11-23 RX ORDER — DEXTROSE MONOHYDRATE 25 G/50ML
12.5 INJECTION, SOLUTION INTRAVENOUS PRN
Status: DISCONTINUED | OUTPATIENT
Start: 2021-11-23 | End: 2021-11-25 | Stop reason: HOSPADM

## 2021-11-23 RX ORDER — INSULIN LISPRO 100 [IU]/ML
0-3 INJECTION, SOLUTION INTRAVENOUS; SUBCUTANEOUS NIGHTLY
Status: DISCONTINUED | OUTPATIENT
Start: 2021-11-23 | End: 2021-11-23

## 2021-11-23 RX ORDER — ACETAMINOPHEN 325 MG/1
650 TABLET ORAL EVERY 6 HOURS PRN
Status: DISCONTINUED | OUTPATIENT
Start: 2021-11-23 | End: 2021-11-25 | Stop reason: HOSPADM

## 2021-11-23 RX ORDER — MAGNESIUM HYDROXIDE/ALUMINUM HYDROXICE/SIMETHICONE 120; 1200; 1200 MG/30ML; MG/30ML; MG/30ML
30 SUSPENSION ORAL EVERY 6 HOURS PRN
Status: DISCONTINUED | OUTPATIENT
Start: 2021-11-23 | End: 2021-11-25 | Stop reason: HOSPADM

## 2021-11-23 RX ORDER — ONDANSETRON 4 MG/1
4 TABLET, ORALLY DISINTEGRATING ORAL EVERY 8 HOURS PRN
Status: DISCONTINUED | OUTPATIENT
Start: 2021-11-23 | End: 2021-11-25 | Stop reason: HOSPADM

## 2021-11-23 RX ORDER — INSULIN LISPRO 100 [IU]/ML
0-6 INJECTION, SOLUTION INTRAVENOUS; SUBCUTANEOUS
Status: DISCONTINUED | OUTPATIENT
Start: 2021-11-23 | End: 2021-11-23

## 2021-11-23 RX ORDER — ATORVASTATIN CALCIUM 20 MG/1
20 TABLET, FILM COATED ORAL NIGHTLY
Status: DISCONTINUED | OUTPATIENT
Start: 2021-11-23 | End: 2021-11-25 | Stop reason: HOSPADM

## 2021-11-23 RX ADMIN — TOCILIZUMAB 800 MG: 20 INJECTION, SOLUTION, CONCENTRATE INTRAVENOUS at 12:25

## 2021-11-23 RX ADMIN — INSULIN LISPRO 9 UNITS: 100 INJECTION, SOLUTION INTRAVENOUS; SUBCUTANEOUS at 12:27

## 2021-11-23 RX ADMIN — ATORVASTATIN CALCIUM 20 MG: 20 TABLET, FILM COATED ORAL at 01:36

## 2021-11-23 RX ADMIN — Medication 2 PUFF: at 21:02

## 2021-11-23 RX ADMIN — INSULIN LISPRO 12 UNITS: 100 INJECTION, SOLUTION INTRAVENOUS; SUBCUTANEOUS at 17:45

## 2021-11-23 RX ADMIN — INSULIN GLARGINE 35 UNITS: 100 INJECTION, SOLUTION SUBCUTANEOUS at 01:36

## 2021-11-23 RX ADMIN — INSULIN LISPRO 12 UNITS: 100 INJECTION, SOLUTION INTRAVENOUS; SUBCUTANEOUS at 08:55

## 2021-11-23 RX ADMIN — ATORVASTATIN CALCIUM 20 MG: 20 TABLET, FILM COATED ORAL at 21:22

## 2021-11-23 RX ADMIN — LISINOPRIL 20 MG: 20 TABLET ORAL at 08:42

## 2021-11-23 RX ADMIN — ENOXAPARIN SODIUM 30 MG: 100 INJECTION SUBCUTANEOUS at 08:41

## 2021-11-23 RX ADMIN — REMDESIVIR 200 MG: 100 INJECTION, POWDER, LYOPHILIZED, FOR SOLUTION INTRAVENOUS at 13:40

## 2021-11-23 RX ADMIN — Medication 10 ML: at 13:41

## 2021-11-23 RX ADMIN — ENOXAPARIN SODIUM 100 MG: 100 INJECTION SUBCUTANEOUS at 21:23

## 2021-11-23 RX ADMIN — INSULIN GLARGINE 35 UNITS: 100 INJECTION, SOLUTION SUBCUTANEOUS at 21:24

## 2021-11-23 RX ADMIN — ENOXAPARIN SODIUM 30 MG: 100 INJECTION SUBCUTANEOUS at 01:36

## 2021-11-23 RX ADMIN — Medication 10 ML: at 21:22

## 2021-11-23 RX ADMIN — INSULIN LISPRO 1 UNITS: 100 INJECTION, SOLUTION INTRAVENOUS; SUBCUTANEOUS at 01:36

## 2021-11-23 RX ADMIN — GUAIFENESIN AND DEXTROMETHORPHAN 5 ML: 100; 10 SYRUP ORAL at 16:46

## 2021-11-23 RX ADMIN — Medication 2000 UNITS: at 08:42

## 2021-11-23 RX ADMIN — INSULIN LISPRO 5 UNITS: 100 INJECTION, SOLUTION INTRAVENOUS; SUBCUTANEOUS at 21:23

## 2021-11-23 RX ADMIN — Medication 2 PUFF: at 07:44

## 2021-11-23 RX ADMIN — GUAIFENESIN AND DEXTROMETHORPHAN 5 ML: 100; 10 SYRUP ORAL at 23:19

## 2021-11-23 RX ADMIN — GUAIFENESIN AND DEXTROMETHORPHAN 5 ML: 100; 10 SYRUP ORAL at 01:26

## 2021-11-23 RX ADMIN — DEXAMETHASONE SODIUM PHOSPHATE 6 MG: 10 INJECTION, SOLUTION INTRAMUSCULAR; INTRAVENOUS at 08:42

## 2021-11-23 ASSESSMENT — PAIN DESCRIPTION - PAIN TYPE: TYPE: ACUTE PAIN

## 2021-11-23 ASSESSMENT — PAIN SCALES - GENERAL
PAINLEVEL_OUTOF10: 4
PAINLEVEL_OUTOF10: 0
PAINLEVEL_OUTOF10: 0

## 2021-11-23 ASSESSMENT — PAIN DESCRIPTION - LOCATION: LOCATION: CHEST

## 2021-11-23 ASSESSMENT — PAIN DESCRIPTION - DESCRIPTORS: DESCRIPTORS: ACHING

## 2021-11-23 NOTE — H&P
Hospital Medicine History and Physical    11/22/2021    Date of Admission: 11/22/2021    Date of Service: Pt seen/examined on 11/22/2021 and admitted to inpatient. Assessment/plan:  1. COVID-19 pneumonia, most likely. COVID-19 test is currently pending. Will check antigen studies (Strep and Legionella). Patient has been started on IV Decadron in the emergency room; will continue. Robitussin for cough. Albuterol inhaler for shortness of breath. 2. Acute respiratory failure with hypoxia. Likely secondary to COVID-19 pneumonia as noted above. Continue treatment of COVID-19 pneumonia as noted above. Once COVID-19 is confirmed, will be a candidate for remdesivir treatment. Continue supplemental oxygen with plan to wean as tolerated. Monitor pulse oximeter closely. 3. Other comorbidities: history of uncontrolled diabetes type 2 with hyperglycemia, hyperlipidemia, essential hypertension, obesity with BMI of 37 kg/m². Activities: Up with assist  Prophylaxis: Subcutaneous Lovenox 30 mg twice daily  Code status: Full code    ==========================================================  Chief complaint:  Chief Complaint   Patient presents with    Generalized Body Aches     Patient in with complaints of feeling ill for four days. States he doesnt feel like eating, body aches, weakness, fevers, chills. Denies being around anyone with covid. History of Presenting Illness: This is a pleasant 57 y.o. male who is unvaccinated for COVID-19, with history of uncontrolled diabetes type 2 with hyperglycemia, hyperlipidemia, essential hypertension, obesity with BMI of 37 kg/m², who presents to the emergency room with complaints of cough, shortness of breath, fever (as high as 102 °F), chills, generalized weakness, diffuse body aches, decreased p.o. intake, ongoing for the past 4 days.     Patient reports that he has not been around anybody with COVID-19 infection; however, he was wearing a net (pourous net) designed as a facemask, goes to work and comes back home, apparently wearing this \"mask. \"  He was surprised about the possibility that he could have COVID-19 infection. Chest x-ray obtained in the emergency room reveals mild patchy bibasilar airspace opacities (right greater than left) concerning for multifocal pneumonia, including viral pneumonia. Patient was noted to be hypoxic with oxygen saturation of 88% on room air. Past Medical History:      Diagnosis Date    Essential hypertension     Hyperlipidemia     Uncontrolled type 2 diabetes mellitus with hyperglycemia ([address]        Past Surgical History:  No past surgical history on file. Medications (prior to admission):  Prior to Admission medications    Medication Sig Start Date End Date Taking? Authorizing Provider   metFORMIN (GLUCOPHAGE) 500 MG tablet Take 1 tablet by mouth 2 times daily (with meals) 3/19/21   Torito Mcdonough MD   atorvastatin (LIPITOR) 20 MG tablet Take 1 tablet by mouth nightly 3/19/21   Torito Mcdonough MD   insulin glargine (LANTUS SOLOSTAR) 100 UNIT/ML injection pen Inject 35 Units into the skin nightly 3/19/21   Torito Mcdonough MD   Insulin Pen Needle 32G X 4 MM MISC 1 each by Does not apply route daily 3/19/21   Torito Mcdonough MD   Blood Glucose Monitoring Suppl (ACCU-CHEK SAM CONNECT) w/Device KIT As directed 3/19/21   Torito Mcdonough MD   lisinopril (PRINIVIL;ZESTRIL) 20 MG tablet Take 1 tablet by mouth daily 3/19/21   Torito Mcdonough MD   blood glucose test strips (ACCU-CHEK SAM PLUS) strip 1 each by In Vitro route 3 times daily As needed. 12/11/20   Torito Mcdonough MD   Lancets MISC 1 each by Does not apply route 3 times daily 12/11/20   Torito Mcdonough MD   Lancets Misc. (Radha Gavia) KIT As directed 12/11/20   Torito Mcdonough MD       Allergy(ies):  Patient has no known allergies. Social History:  TOBACCO:  reports that he has never smoked.  He has never used smokeless tobacco.  ETOH:  has no history on file for alcohol use. Family History:      Family history unknown: Yes       Review of Systems:  Pertinent positives are listed in HPI. At least 10-point ROS reviewed and were negative. Vitals and physical examination:  BP (!) 163/93   Pulse 93   Temp 99.9 °F (37.7 °C)   Resp 22   SpO2 94%   Gen/overall appearance: Not in acute distress. Alert. Oriented x3. Head: Normocephalic, atraumatic  Eyes: EOMI, good acuity  ENT: Oral mucosa moist  Neck: No JVD, thyromegaly  CVS: Nml S1S2, no MRG, RRR  Pulm: Fine crackles in lung bases. No wheezes. Gastrointestinal: Soft, NT/ND, +BS  Musculoskeletal: No edema. Warm  Neuro: No focal deficit. Moves extremity spontaneously. Psychiatry: Appropriate affect. Not agitated. Skin: Warm, dry with normal turgor. No rash  Capillary refill: Brisk,< 3 seconds   Peripheral Pulses: +2 palpable, equal bilaterally       Labs/imaging/EKG:  CBC:   Recent Labs     11/22/21 1857   WBC 7.6   HGB 13.7        BMP:    Recent Labs     11/22/21 1857   *   K 4.0   CL 92*   CO2 26   BUN 12   CREATININE 1.2   GLUCOSE 198*     Hepatic:   Recent Labs     11/22/21 1857   AST 47*   ALT 34   BILITOT 0.7   ALKPHOS 140*       XR CHEST PORTABLE    Result Date: 11/22/2021  EXAMINATION: ONE X-RAY VIEW OF THE CHEST 11/22/2021 7:30 pm COMPARISON: 08/05/2006. HISTORY: ORDERING SYSTEM PROVIDED HISTORY:  SOB TECHNOLOGIST PROVIDED HISTORY: Reason for Exam:  SOB Reason for Exam:  Ill for four days. States he doesn't feel like eating, body aches, weakness, fevers, chills. Acuity:  Acute Type of Exam:  Initial FINDINGS: Mild patchy/streaky bibasilar airspace opacities, right worse than left. No pleural effusions, pulmonary edema or pneumothoraces. Cardiac and mediastinal silhouettes are within normal limits. No acute bony abnormalities.      Mild patchy/streaky bibasilar airspace opacities, right worse than left, concerning for multifocal pneumonia with atypical/viral pneumonia in the differential.       EKG: Sinus tachycardia, rate 102 beats per minutes. No acute ST/T changes. I reviewed EKG. Discussed with ER NP.       Thank you Juan Pablo Clifton MD for the opportunity to be involved in this patient's care.    -----------------------------  Joyce Aquino MD  [facility]

## 2021-11-23 NOTE — ED PROVIDER NOTES
[id number] [facility] Emergency Department      Pt Name: Jean Dodd  MRN: [id number]  Armslaurentgfcarolee 1960  Date of evaluation: 11/22/2021  Provider: Armandina Barthel, MD  I independently performed a history and physical on Jean Dodd. All diagnostic, treatment, and disposition decisions were made by myself in conjunction with the advanced practice provider. HPI: Jean Dodd presented with   Chief Complaint   Patient presents with    Generalized Body Aches     Patient in with complaints of feeling ill for four days. States he doesnt feel like eating, body aches, weakness, fevers, chills. Denies being around anyone with covid. Jean Dodd has a past medical history of Essential hypertension, Hyperlipidemia, and Uncontrolled type 2 diabetes mellitus with hyperglycemia ([facility] [address] He has no past surgical history on file. No current facility-administered medications on file prior to encounter. Current Outpatient Medications on File Prior to Encounter   Medication Sig Dispense Refill    atorvastatin (LIPITOR) 20 MG tablet Take 1 tablet by mouth nightly 30 tablet 3    lisinopril (PRINIVIL;ZESTRIL) 20 MG tablet Take 1 tablet by mouth daily 30 tablet 5    metFORMIN (GLUCOPHAGE) 500 MG tablet Take 1 tablet by mouth 2 times daily (with meals) 60 tablet 3    insulin glargine (LANTUS SOLOSTAR) 100 UNIT/ML injection pen Inject 35 Units into the skin nightly 5 pen 3    Insulin Pen Needle 32G X 4 MM MISC 1 each by Does not apply route daily 100 each 3    Blood Glucose Monitoring Suppl (ACCU-CHEK SAM CONNECT) w/Device KIT As directed 1 kit 1    blood glucose test strips (ACCU-CHEK SAM PLUS) strip 1 each by In Vitro route 3 times daily As needed.  100 each 3    Lancets MISC 1 each by Does not apply route 3 times daily 200 each 5    Lancets Misc. (ACCU-CHEK MULTICLIX LANCET DEV) KIT As directed 1 kit 0     PHYSICAL EXAM  Vitals: BP (!) 163/93   Pulse 93   Temp 99.9 °F (37.7 °C)   Resp 22   SpO2 94%   Constitutional:  57 y.o. male alert, cooperative  HENT:  Atraumatic scalp, mucous membranes moist  Eyes:   Conjunctiva clear, no icterus  Neck:  Supple, no visible JVD, no signs of injury  Cardiovascular:  Regular, no rubs  Thorax & Lungs:  No accessory muscle usage, decreased basilar BS  Abdomen:  Soft, non distended, NT  Back:  No deformity  Genitalia:  Deferred  Rectal:  Deferred  Extremities:  No cyanosis, no edema, adequate perfusion  Skin:  Warm, dry  Neurologic:  Alert, no slurred speech  Psychiatric:  Affect appropriate    Medical Decision Making and Plan:  Briefly, this is an 57 y.o.male who presented with concern for aches, poor appetite, fatigue. Findings suggestive of COVID pneumonia, hypoxia on RA. Stable with supplemental oxygen. Plan is to admit for further care. For further details of [facility] encounter, please see documentation by advanced practice provider Haseeb Gallardo CNP.      Labs Reviewed   COMPREHENSIVE METABOLIC PANEL - Abnormal; Notable for the following components:       Result Value    Sodium 133 (*)     Chloride 92 (*)     Glucose 198 (*)     Albumin/Globulin Ratio 0.7 (*)     Alkaline Phosphatase 140 (*)     AST 47 (*)     All other components within normal limits    Narrative:     Performed at:  [facility] [address], [place], [facility] [employer]   Phone [phone]   POCT GLUCOSE - Abnormal; Notable for the following components:    POC Glucose 234 (*)     All other components within normal limits    Narrative:     Performed at:  [facility] [address], [place], [facility] [employer]   Phone [phone]   [place] A/B ANTIGENS    Narrative:     Performed at:  [facility] [address], [place], [facility] [employer]   Phone [phone]   CULTURE, BLOOD 1   CULTURE, BLOOD 2   LEGIONELLA ANTIGEN, URINE   STREP PNEUMONIAE ANTIGEN   CULTURE, RESPIRATORY   CBC WITH AUTO DIFFERENTIAL    Narrative:     Performed at:  [facility]  [address],  [place], [address]   Phone [phone]   LACTATE, SEPSIS    Narrative:     Performed at:  [facility]  [address],  [place], [address]   Phone [phone]   TROPONIN    Narrative:     Performed at:  [facility]  [address],  [place], [address]   Phone [phone]   BRAIN NATRIURETIC PEPTIDE    Narrative:     Performed at:  [facility]  [address],  [place], [address]   Phone [phone]   PROCALCITONIN    Narrative:     Performed at:  [facility]  [address],  [place], [address]   Phone [phone]   COVID-19   COMPREHENSIVE METABOLIC PANEL   MAGNESIUM   PHOSPHORUS   CBC WITH AUTO DIFFERENTIAL   PROTIME-INR   APTT   FIBRINOGEN   C-REACTIVE PROTEIN   LACTATE DEHYDROGENASE   FERRITIN   D-DIMER, QUANTITATIVE   TROPONIN   LACTIC ACID, PLASMA   VITAMIN D 25 HYDROXY   PROCALCITONIN   HEMOGLOBIN A1C   POCT GLUCOSE   POCT GLUCOSE   POCT GLUCOSE   POCT GLUCOSE   POCT GLUCOSE   POCT GLUCOSE   POCT GLUCOSE     RADIOLOGY:   Plain x-rays were viewed by me:   XR CHEST PORTABLE   Final Result   Mild patchy/streaky bibasilar airspace opacities, right worse than left,   concerning for multifocal pneumonia with atypical/viral pneumonia in the   differential.           EKG:  Read by me in the absence of a cardiologist shows:  ST, rate 102, intervals normal, axis 56, LVH, NSSTTWA, minimal change from Dec 2020    CRITICAL CARE:   Total critical care time of 31 minutes (excludes any time for procedures).   This includes but not limited to vital sign monitoring, medication, clinical response to medications, interpretation of diagnostics, review of nursing notes, pertinent record review, discussions about patient condition, consultation time, documentation time. Critical care due to the patient's hypoxia.     Vitals:    11/22/21 2200 11/22/21 2215 11/22/21 2345 11/23/21 0102   BP: (!) 165/96 (!) 164/97  (!) 155/96   Pulse: 91 89 81 88   Resp: 27 29 26 20   Temp:    98.4 °F (36.9 °C)   TempSrc:    Oral   SpO2: 91% 91% 93% 92%   Weight:    217 lb 9.5 oz (98.7 kg)   Height:    5' 7\" (1.702 m)     Medications administered:  Medications   atorvastatin (LIPITOR) tablet 20 mg (20 mg Oral Given 11/23/21 0136)   insulin glargine (LANTUS;BASAGLAR) injection pen 35 Units (35 Units SubCUTAneous Given 11/23/21 0136)   lisinopril (PRINIVIL;ZESTRIL) tablet 20 mg (has no administration in time range)   insulin lispro (1 Unit Dial) 0-6 Units (has no administration in time range)   insulin lispro (1 Unit Dial) 0-3 Units (1 Units SubCUTAneous Given 11/23/21 0136)   glucose (GLUTOSE) 40 % oral gel 15 g (has no administration in time range)   dextrose 50 % IV solution (has no administration in time range)   glucagon (rDNA) injection 1 mg (has no administration in time range)   dextrose 5 % solution (has no administration in time range)   potassium chloride (KLOR-CON M) extended release tablet 40 mEq (has no administration in time range)     Or   potassium bicarb-citric acid (EFFER-K) effervescent tablet 40 mEq (has no administration in time range)     Or   potassium chloride 10 mEq/100 mL IVPB (Peripheral Line) (has no administration in time range)   magnesium sulfate 1000 mg in dextrose 5% 100 mL IVPB (has no administration in time range)   sodium phosphate 15.78 mmol in dextrose 5 % 250 mL IVPB (has no administration in time range)     Or   sodium phosphate 31.59 mmol in dextrose 5 % 250 mL IVPB (has no administration in time range)   sodium chloride flush 0.9 % injection 5-40 mL (has no administration in time range)   sodium chloride flush 0.9 % injection 5-40 mL (has no administration in time range)   0.9 % sodium chloride infusion (has no administration in time range)   aluminum & magnesium hydroxide-simethicone (MAALOX) 200-200-20 MG/5ML suspension 30 mL (has no administration in time range)   enoxaparin (LOVENOX) injection 30 mg (30 mg SubCUTAneous Given 11/23/21 0136)   ondansetron (ZOFRAN-ODT) disintegrating tablet 4 mg (has no administration in time range)     Or   ondansetron (ZOFRAN) injection 4 mg (has no administration in time range)   polyethylene glycol (GLYCOLAX) packet 17 g (has no administration in time range)   acetaminophen (TYLENOL) tablet 650 mg (has no administration in time range)     Or   acetaminophen (TYLENOL) suppository 650 mg (has no administration in time range)   guaiFENesin-dextromethorphan (ROBITUSSIN DM) 100-10 MG/5ML syrup 5 mL (5 mLs Oral Given 11/23/21 0126)   dexamethasone (PF) (DECADRON) injection 6 mg (has no administration in time range)   Vitamin D (CHOLECALCIFEROL) tablet 2,000 Units (has no administration in time range)   albuterol sulfate  (90 Base) MCG/ACT inhaler 2 puff (has no administration in time range)   influenza quadrivalent split vaccine (FLUZONE;FLUARIX;FLULAVAL;AFLURIA) injection 0.5 mL (has no administration in time range)   lactated ringers infusion 1,000 mL (0 mLs IntraVENous Stopped 11/22/21 2144)   dexamethasone (DECADRON) injection 6 mg (6 mg IntraVENous Given 11/22/21 2100)     FINAL IMPRESSION:    1. Acute hypoxemic respiratory failure (HCC)    2. Suspected COVID-19 virus infection    3.  Multifocal pneumonia       DISPOSITION Admitted 11/22/2021 09:47:29 PM       Gabriel Quiroz MD  11/23/21 8176

## 2021-11-23 NOTE — PROGRESS NOTES
Pt's daughter needed document stating that Pt is covid positive for work. Pt lives with this daughter. Pt gave permission for his results to be shared with pt's daughter's workplace([employer] in [employer]).

## 2021-11-23 NOTE — PROGRESS NOTES
11/23/21 0159   RT Protocol   History Pulmonary Disease 0   Respiratory pattern 2   Breath sounds 2   Cough 0   Indications for Bronchodilator Therapy Decreased or absent breath sounds   Bronchodilator Assessment Score 4

## 2021-11-23 NOTE — CARE COORDINATION
Discharge Planning Assessment  Discharge Planning Assessment  RN/SW discharge planner met with patient/ (and family member) to discuss reason for admission, current living situation, and potential needs at the time of discharge consult noted. CM completed with pt on phone d/t pending covid test result    Demographics/Insurance verified Yes CM updated demographics with current address     Current type of dwellinnd floor apartment with no steps outside of building and 12 up to apartment     Patient from ECF/SW confirmed with:n/a     Living arrangements:Lives with his daughter and granddaughter     Level of function/Support:Independent and has supportive family     PCP:Kaitlynn Montero     Last Visit to PCP:pt states about a year ago     DME:none     Active with any community resources/agencies/skilled home care:none, denies need to CM for COA referral     Medication compliance issues:independent     Financial issues that could impact healthcare:none         Tentative discharge plan:home with possible oxygen or home care   Discussed and provided facilities of choice if transition to a skilled nursing facility is required at the time of discharge no therapy ordered at this time. CM will monitor. Discussed with patient and/or family that on the day of discharge home tentative time of discharge will be between 10 AM and noon.     Transportation at the time of [address], [facility], [address]  [phone]

## 2021-11-23 NOTE — ED NOTES
PT oxygen saturation 88% on RA at this time, this RN placed PT on 2L O2/NC. Oxygen saturation up to 92% at this time. Davina Esquivel NP notified.      Steve Espinosa RN  11/22/21 2016

## 2021-11-23 NOTE — PROGRESS NOTES
Patients head to toe assessment completed. Vital signs WNL. Bed alarm engaged, call light within reach. Scheduled medications given per MAR. Patient complain of chest discomfort with cough. Robitussin given. Patient resting in bed. Will continue to monitor.

## 2021-11-23 NOTE — ED NOTES
PT report given to DARCI Winter at this time. They state no further questions or concerns.      Aaron Meyers RN  11/22/21 1764

## 2021-11-23 NOTE — PROGRESS NOTES
Daily  Vitamin D (CHOLECALCIFEROL) tablet 2,000 Units, Daily  albuterol sulfate  (90 Base) MCG/ACT inhaler 2 puff, Q4H PRN  influenza quadrivalent split vaccine (FLUZONE;FLUARIX;FLULAVAL;AFLURIA) injection 0.5 mL, Prior to discharge  albuterol sulfate  (90 Base) MCG/ACT inhaler 2 puff, BID        Objective:  BP (!) 152/87   Pulse 79   Temp 98.4 °F (36.9 °C) (Oral)   Resp 20   Ht 5' 7\" (1.702 m)   Wt 217 lb 9.5 oz (98.7 kg)   SpO2 92%   BMI 34.08 kg/m²     Intake/Output Summary (Last 24 hours) at 11/23/2021 0735  Last data filed at 11/23/2021 0640  Gross per 24 hour   Intake 1240 ml   Output 300 ml   Net 940 ml      Wt Readings from Last 3 Encounters:   11/23/21 217 lb 9.5 oz (98.7 kg)   03/19/21 239 lb (108.4 kg)   12/11/20 236 lb 15.9 oz (107.5 kg)       General appearance:  Appears comfortable, alert and pleasant   Eyes: Sclera clear. Pupils equal.  ENT: Moist oral mucosa. Trachea midline, no adenopathy. Cardiovascular: Regular rhythm, normal S1, S2. No murmur. No edema in lower extremities  Respiratory: Not using accessory muscles. Bi basilar crackls noted   GI: Abdomen soft, no tenderness, not distended, normal bowel sounds  Musculoskeletal: No cyanosis in digits, neck supple  Neurology: CN 2-12 grossly intact. No speech or motor deficits  Psych: Normal affect. Alert and oriented in time, place and person  Skin: Warm, dry, normal turgor    Labs and Tests:  CBC:   Recent Labs     11/22/21 1857 11/23/21  0552   WBC 7.6 5.7   HGB 13.7 12.9*    345     BMP:    Recent Labs     11/22/21 1857 11/23/21  0552   * 135*   K 4.0 4.4   CL 92* 97*   CO2 26 25   BUN 12 13   CREATININE 1.2 1.0   GLUCOSE 198* 306*     Hepatic:   Recent Labs     11/22/21 1857 11/23/21  0552   AST 47* 53*   ALT 34 42*   BILITOT 0.7 0.6   ALKPHOS 140* 145*     AIC 7.8    Results for Alex Bo \"DAVID\" (MRN [id number]) as of 11/23/2021 14:52   Ref.  Range 11/23/2021 00:55 11/23/2021 08:54 11/23/2021 12:27 POC Glucose Latest Ref Range: 70 - 99 mg/dl 234 (H) 322 (H) 262 (H)       Problem List  Active Problems:    Pneumonia due to COVID-19 virus  Resolved Problems:    * No resolved hospital problems. *       Assessment & Plan:   1. COVID :  On Remdesevir,  decadron and bid lovenox. Currently requires 2 liters , sats at 92%   2. Uncontrolled T2DM with  Steroid induced hyperglycemia:  On hs lantus,  I have increased humalog to high dose ,  Considered adding prandial humalog in the am if BG values still elevated   3. HTN:  Resume home dose of ACE and monitor       Diet: ADULT DIET;  Regular; 4 carb choices (60 gm/meal)  Code:Full Code  DVT PPX      YOUNG Putnam CNP   11/23/2021 7:35 AM

## 2021-11-24 LAB
A/G RATIO: 0.8 (ref 1.1–2.2)
ALBUMIN SERPL-MCNC: 3.3 G/DL (ref 3.4–5)
ALP BLD-CCNC: 137 U/L (ref 40–129)
ALT SERPL-CCNC: 47 U/L (ref 10–40)
ANION GAP SERPL CALCULATED.3IONS-SCNC: 14 MMOL/L (ref 3–16)
AST SERPL-CCNC: 42 U/L (ref 15–37)
BASOPHILS ABSOLUTE: 0 K/UL (ref 0–0.2)
BASOPHILS RELATIVE PERCENT: 0.1 %
BILIRUB SERPL-MCNC: 0.5 MG/DL (ref 0–1)
BUN BLDV-MCNC: 22 MG/DL (ref 7–20)
C-REACTIVE PROTEIN: 82.1 MG/L (ref 0–5.1)
CALCIUM SERPL-MCNC: 9.4 MG/DL (ref 8.3–10.6)
CHLORIDE BLD-SCNC: 102 MMOL/L (ref 99–110)
CO2: 23 MMOL/L (ref 21–32)
CREAT SERPL-MCNC: 1 MG/DL (ref 0.8–1.3)
D DIMER: 310 NG/ML DDU (ref 0–229)
EOSINOPHILS ABSOLUTE: 0 K/UL (ref 0–0.6)
EOSINOPHILS RELATIVE PERCENT: 0 %
ESTIMATED AVERAGE GLUCOSE: 159.9 MG/DL
GFR AFRICAN AMERICAN: >60
GFR NON-AFRICAN AMERICAN: >60
GLUCOSE BLD-MCNC: 187 MG/DL (ref 70–99)
GLUCOSE BLD-MCNC: 192 MG/DL (ref 70–99)
GLUCOSE BLD-MCNC: 203 MG/DL (ref 70–99)
GLUCOSE BLD-MCNC: 322 MG/DL (ref 70–99)
GLUCOSE BLD-MCNC: 338 MG/DL (ref 70–99)
HBA1C MFR BLD: 7.2 %
HCT VFR BLD CALC: 38.8 % (ref 40.5–52.5)
HEMOGLOBIN: 12.9 G/DL (ref 13.5–17.5)
L. PNEUMOPHILA SEROGP 1 UR AG: NORMAL
LYMPHOCYTES ABSOLUTE: 1 K/UL (ref 1–5.1)
LYMPHOCYTES RELATIVE PERCENT: 9.5 %
MAGNESIUM: 2.4 MG/DL (ref 1.8–2.4)
MCH RBC QN AUTO: 27.6 PG (ref 26–34)
MCHC RBC AUTO-ENTMCNC: 33.2 G/DL (ref 31–36)
MCV RBC AUTO: 83 FL (ref 80–100)
MONOCYTES ABSOLUTE: 1.1 K/UL (ref 0–1.3)
MONOCYTES RELATIVE PERCENT: 10 %
NEUTROPHILS ABSOLUTE: 8.9 K/UL (ref 1.7–7.7)
NEUTROPHILS RELATIVE PERCENT: 80.4 %
PDW BLD-RTO: 13.9 % (ref 12.4–15.4)
PERFORMED ON: ABNORMAL
PHOSPHORUS: 4.2 MG/DL (ref 2.5–4.9)
PLATELET # BLD: 409 K/UL (ref 135–450)
PMV BLD AUTO: 8.7 FL (ref 5–10.5)
POTASSIUM SERPL-SCNC: 4.2 MMOL/L (ref 3.5–5.1)
RBC # BLD: 4.68 M/UL (ref 4.2–5.9)
SODIUM BLD-SCNC: 139 MMOL/L (ref 136–145)
STREP PNEUMONIAE ANTIGEN, URINE: NORMAL
TOTAL PROTEIN: 7.6 G/DL (ref 6.4–8.2)
WBC # BLD: 11 K/UL (ref 4–11)

## 2021-11-24 PROCEDURE — 6360000002 HC RX W HCPCS: Performed by: HOSPITALIST

## 2021-11-24 PROCEDURE — 2700000000 HC OXYGEN THERAPY PER DAY

## 2021-11-24 PROCEDURE — 6370000000 HC RX 637 (ALT 250 FOR IP): Performed by: NURSE PRACTITIONER

## 2021-11-24 PROCEDURE — 36415 COLL VENOUS BLD VENIPUNCTURE: CPT

## 2021-11-24 PROCEDURE — 2580000003 HC RX 258: Performed by: HOSPITALIST

## 2021-11-24 PROCEDURE — 84100 ASSAY OF PHOSPHORUS: CPT

## 2021-11-24 PROCEDURE — 2500000003 HC RX 250 WO HCPCS: Performed by: HOSPITALIST

## 2021-11-24 PROCEDURE — 94640 AIRWAY INHALATION TREATMENT: CPT

## 2021-11-24 PROCEDURE — 85025 COMPLETE CBC W/AUTO DIFF WBC: CPT

## 2021-11-24 PROCEDURE — 1200000000 HC SEMI PRIVATE

## 2021-11-24 PROCEDURE — 85379 FIBRIN DEGRADATION QUANT: CPT

## 2021-11-24 PROCEDURE — 2580000003 HC RX 258

## 2021-11-24 PROCEDURE — 6370000000 HC RX 637 (ALT 250 FOR IP): Performed by: INTERNAL MEDICINE

## 2021-11-24 PROCEDURE — 80053 COMPREHEN METABOLIC PANEL: CPT

## 2021-11-24 PROCEDURE — 83735 ASSAY OF MAGNESIUM: CPT

## 2021-11-24 PROCEDURE — 2580000003 HC RX 258: Performed by: INTERNAL MEDICINE

## 2021-11-24 PROCEDURE — 86140 C-REACTIVE PROTEIN: CPT

## 2021-11-24 PROCEDURE — 94761 N-INVAS EAR/PLS OXIMETRY MLT: CPT

## 2021-11-24 RX ORDER — AMLODIPINE BESYLATE 5 MG/1
5 TABLET ORAL DAILY
Status: DISCONTINUED | OUTPATIENT
Start: 2021-11-24 | End: 2021-11-25 | Stop reason: HOSPADM

## 2021-11-24 RX ORDER — INSULIN LISPRO 100 [IU]/ML
5 INJECTION, SOLUTION INTRAVENOUS; SUBCUTANEOUS
Status: DISCONTINUED | OUTPATIENT
Start: 2021-11-24 | End: 2021-11-25 | Stop reason: HOSPADM

## 2021-11-24 RX ORDER — SODIUM CHLORIDE 9 MG/ML
INJECTION, SOLUTION INTRAVENOUS
Status: COMPLETED
Start: 2021-11-24 | End: 2021-11-24

## 2021-11-24 RX ADMIN — Medication 2 PUFF: at 21:26

## 2021-11-24 RX ADMIN — Medication 2000 UNITS: at 08:49

## 2021-11-24 RX ADMIN — INSULIN LISPRO 12 UNITS: 100 INJECTION, SOLUTION INTRAVENOUS; SUBCUTANEOUS at 17:37

## 2021-11-24 RX ADMIN — AMLODIPINE BESYLATE 5 MG: 5 TABLET ORAL at 17:38

## 2021-11-24 RX ADMIN — ACETAMINOPHEN 650 MG: 325 TABLET ORAL at 12:26

## 2021-11-24 RX ADMIN — INSULIN LISPRO 5 UNITS: 100 INJECTION, SOLUTION INTRAVENOUS; SUBCUTANEOUS at 12:26

## 2021-11-24 RX ADMIN — LISINOPRIL 20 MG: 20 TABLET ORAL at 08:50

## 2021-11-24 RX ADMIN — ATORVASTATIN CALCIUM 20 MG: 20 TABLET, FILM COATED ORAL at 20:18

## 2021-11-24 RX ADMIN — INSULIN GLARGINE 35 UNITS: 100 INJECTION, SOLUTION SUBCUTANEOUS at 20:18

## 2021-11-24 RX ADMIN — SODIUM CHLORIDE 25 ML: 9 INJECTION, SOLUTION INTRAVENOUS at 08:54

## 2021-11-24 RX ADMIN — INSULIN LISPRO 5 UNITS: 100 INJECTION, SOLUTION INTRAVENOUS; SUBCUTANEOUS at 17:37

## 2021-11-24 RX ADMIN — Medication 10 ML: at 20:18

## 2021-11-24 RX ADMIN — Medication 2 PUFF: at 04:52

## 2021-11-24 RX ADMIN — Medication 10 ML: at 08:49

## 2021-11-24 RX ADMIN — Medication 2 PUFF: at 10:14

## 2021-11-24 RX ADMIN — ENOXAPARIN SODIUM 100 MG: 100 INJECTION SUBCUTANEOUS at 20:18

## 2021-11-24 RX ADMIN — DEXAMETHASONE SODIUM PHOSPHATE 20 MG: 4 INJECTION, SOLUTION INTRA-ARTICULAR; INTRALESIONAL; INTRAMUSCULAR; INTRAVENOUS; SOFT TISSUE at 08:56

## 2021-11-24 RX ADMIN — REMDESIVIR 100 MG: 100 INJECTION, POWDER, LYOPHILIZED, FOR SOLUTION INTRAVENOUS at 09:49

## 2021-11-24 RX ADMIN — INSULIN LISPRO 5 UNITS: 100 INJECTION, SOLUTION INTRAVENOUS; SUBCUTANEOUS at 08:58

## 2021-11-24 RX ADMIN — INSULIN LISPRO 3 UNITS: 100 INJECTION, SOLUTION INTRAVENOUS; SUBCUTANEOUS at 12:26

## 2021-11-24 RX ADMIN — INSULIN LISPRO 3 UNITS: 100 INJECTION, SOLUTION INTRAVENOUS; SUBCUTANEOUS at 08:57

## 2021-11-24 RX ADMIN — INSULIN LISPRO 6 UNITS: 100 INJECTION, SOLUTION INTRAVENOUS; SUBCUTANEOUS at 20:19

## 2021-11-24 RX ADMIN — ENOXAPARIN SODIUM 100 MG: 100 INJECTION SUBCUTANEOUS at 08:49

## 2021-11-24 RX ADMIN — ACETAMINOPHEN 650 MG: 325 TABLET ORAL at 05:15

## 2021-11-24 RX ADMIN — GUAIFENESIN AND DEXTROMETHORPHAN 5 ML: 100; 10 SYRUP ORAL at 05:15

## 2021-11-24 ASSESSMENT — PAIN DESCRIPTION - LOCATION
LOCATION: ARM
LOCATION: CHEST

## 2021-11-24 ASSESSMENT — PAIN DESCRIPTION - ORIENTATION
ORIENTATION: RIGHT
ORIENTATION: RIGHT;LOWER

## 2021-11-24 ASSESSMENT — PAIN DESCRIPTION - PAIN TYPE
TYPE: ACUTE PAIN
TYPE: ACUTE PAIN

## 2021-11-24 ASSESSMENT — PAIN SCALES - GENERAL
PAINLEVEL_OUTOF10: 2
PAINLEVEL_OUTOF10: 5

## 2021-11-24 ASSESSMENT — PAIN DESCRIPTION - DESCRIPTORS: DESCRIPTORS: THROBBING

## 2021-11-24 NOTE — PLAN OF CARE
Nutrition Problem #1: Increased nutrient needs  Intervention: Food and/or Nutrient Delivery: Continue Current Diet, Start Oral Nutrition Supplement  Nutritional Goals: Patient will eat 50% or greater of meals and supplements to prevent further weight loss.

## 2021-11-24 NOTE — PROGRESS NOTES
Got patient up with SBA to the restroom, he brushed his teeth and sat in the chair for 30 min with this RN at bedside. Pt in the chair eating at this time states he is feeling a lot better.

## 2021-11-24 NOTE — PROGRESS NOTES
[address] [place] [address] PROGRESS NOTE    11/24/2021 8:24 AM        Name: Karla Hernandez . Admitted: 11/22/2021  Primary Care Provider: Lucretia Floyd MD (Tel: [phone])      Subjective:  .     Admitted with COVID, unvaccinated  Seen this am  Feels a \"little better\"  No reports of pain or dyspnea   He needs much encouragement to move   We discussed the need to get out of bed and sit in the chair etc.      Reviewed interval ancillary notes    Current Medications  atorvastatin (LIPITOR) tablet 20 mg, Nightly  insulin glargine (LANTUS;BASAGLAR) injection pen 35 Units, Nightly  lisinopril (PRINIVIL;ZESTRIL) tablet 20 mg, Daily  glucose (GLUTOSE) 40 % oral gel 15 g, PRN  dextrose 50 % IV solution, PRN  glucagon (rDNA) injection 1 mg, PRN  dextrose 5 % solution, PRN  potassium chloride (KLOR-CON M) extended release tablet 40 mEq, PRN   Or  potassium bicarb-citric acid (EFFER-K) effervescent tablet 40 mEq, PRN   Or  potassium chloride 10 mEq/100 mL IVPB (Peripheral Line), PRN  magnesium sulfate 1000 mg in dextrose 5% 100 mL IVPB, PRN  sodium phosphate 15.78 mmol in dextrose 5 % 250 mL IVPB, PRN   Or  sodium phosphate 31.59 mmol in dextrose 5 % 250 mL IVPB, PRN  sodium chloride flush 0.9 % injection 5-40 mL, 2 times per day  sodium chloride flush 0.9 % injection 5-40 mL, PRN  0.9 % sodium chloride infusion, PRN  aluminum & magnesium hydroxide-simethicone (MAALOX) 200-200-20 MG/5ML suspension 30 mL, Q6H PRN  ondansetron (ZOFRAN-ODT) disintegrating tablet 4 mg, Q8H PRN   Or  ondansetron (ZOFRAN) injection 4 mg, Q6H PRN  polyethylene glycol (GLYCOLAX) packet 17 g, Daily PRN  acetaminophen (TYLENOL) tablet 650 mg, Q6H PRN   Or  acetaminophen (TYLENOL) suppository 650 mg, Q6H PRN  guaiFENesin-dextromethorphan (ROBITUSSIN DM) 100-10 MG/5ML syrup 5 mL, Q4H PRN  Vitamin D (CHOLECALCIFEROL) tablet 2,000 Units, Daily  albuterol sulfate HFA 108 (90 Base) MCG/ACT inhaler 2 puff, Q4H PRN  influenza quadrivalent split vaccine (FLUZONE;FLUARIX;FLULAVAL;AFLURIA) injection 0.5 mL, Prior to discharge  albuterol sulfate  (90 Base) MCG/ACT inhaler 2 puff, BID  insulin lispro (1 Unit Dial) 0-18 Units, TID WC  insulin lispro (1 Unit Dial) 0-9 Units, Nightly  remdesivir 100 mg in sodium chloride 0.9 % 250 mL IVPB, Daily  dexamethasone (DECADRON) 20 mg in sodium chloride 0.9 % IVPB, Daily  enoxaparin (LOVENOX) injection 100 mg, BID        Objective:  BP (!) 157/76   Pulse 73   Temp 98.4 °F (36.9 °C) (Oral)   Resp 18   Ht 5' 7\" (1.702 m)   Wt 217 lb 9.5 oz (98.7 kg)   SpO2 93%   BMI 34.08 kg/m²     Intake/Output Summary (Last 24 hours) at 11/24/2021 0824  Last data filed at 11/24/2021 0451  Gross per 24 hour   Intake 530 ml   Output 500 ml   Net 30 ml      Wt Readings from Last 3 Encounters:   11/23/21 217 lb 9.5 oz (98.7 kg)   03/19/21 239 lb (108.4 kg)   12/11/20 236 lb 15.9 oz (107.5 kg)       General appearance:  Appears comfortable, looks ill today, moves very slowly   Eyes: Sclera clear. Pupils equal.  ENT: Moist oral mucosa. Trachea midline, no adenopathy. Cardiovascular: Regular rhythm, normal S1, S2. No murmur. No edema in lower extremities  Respiratory: Not using accessory muscles. Bi basilar crackls noted   GI: Abdomen soft, no tenderness, not distended, normal bowel sounds  Musculoskeletal: No cyanosis in digits, neck supple  Neurology: CN 2-12 grossly intact. No speech or motor deficits  Psych: Normal affect.  Alert and oriented in time, place and person  Skin: Warm, dry, normal turgor    Labs and Tests:  CBC:   Recent Labs     11/22/21  1857 11/23/21  0552 11/24/21  0644   WBC 7.6 5.7 11.0   HGB 13.7 12.9* 12.9*    345 409     BMP:    Recent Labs     11/22/21  1857 11/23/21  0552 11/24/21  0644   * 135* 139   K 4.0 4.4 4.2   CL 92* 97* 102   CO2 26 25 23   BUN 12 13 22*   CREATININE 1.2 1.0 1.0   GLUCOSE 198* 306* 203* Hepatic:   Recent Labs     11/22/21  1857 11/23/21  0552 11/24/21  0644   AST 47* 53* 42*   ALT 34 42* 47*   BILITOT 0.7 0.6 0.5   ALKPHOS 140* 145* 137*     AIC 7.8    Results for Alonzo Yee \"DAVID\" (MRN [id number]) as of 11/24/2021 08:27   Ref. Range 11/23/2021 17:25 11/23/2021 19:50 11/24/2021 07:17   POC Glucose Latest Ref Range: 70 - 99 mg/dl 327 (H) 287 (H) 192 (H)       D dimer 1258 , 310   82    Problem List  Active Problems:    Pneumonia due to COVID-19 virus  Resolved Problems:    * No resolved hospital problems. *       Assessment & Plan:   1. COVID :  On Remdesevir,  decadron and bid lovenox. Currently requires 3 liters which is an increased oxygen demand. Inflammatory markers are trending down  2. Uncontrolled T2DM with  Steroid induced hyperglycemia:  On hs lantus,  I have added 5 units prandial with correction will follow closely    3. HTN:  BP still elevated despite home dose of ACE,  Will add norvasc and follow closely       Diet: ADULT DIET;  Regular; 4 carb choices (60 gm/meal)  Code:Full Code  DVT PPX      YOUNG Washburn CNP   11/24/2021 8:24 AM

## 2021-11-24 NOTE — PROGRESS NOTES
Patient complaining of pain under right arm. Rates pain 5/10. PRN Tylenol given. Sat 93% on 3L. Robitussin given for cough.  Will continue to monitor

## 2021-11-24 NOTE — PLAN OF CARE
Problem: Pain:  Goal: Pain level will decrease  Description: Pain level will decrease  Outcome: Ongoing  Goal: Control of acute pain  Description: Control of acute pain  Outcome: Ongoing  Goal: Control of chronic pain  Description: Control of chronic pain  Outcome: Ongoing     Problem: Falls - Risk of:  Goal: Will remain free from falls  Description: Will remain free from falls  Outcome: Ongoing  Goal: Absence of physical injury  Description: Absence of physical injury  Outcome: Ongoing     Problem: Airway Clearance - Ineffective  Goal: Achieve or maintain patent airway  Outcome: Ongoing     Problem: Gas Exchange - Impaired  Goal: Absence of hypoxia  Outcome: Ongoing  Goal: Promote optimal lung function  Outcome: Ongoing     Problem: Breathing Pattern - Ineffective  Goal: Ability to achieve and maintain a regular respiratory rate  Outcome: Ongoing     Problem:  Body Temperature -  Risk of, Imbalanced  Goal: Ability to maintain a body temperature within defined limits  Outcome: Ongoing  Goal: Will regain or maintain usual level of consciousness  Outcome: Ongoing  Goal: Complications related to the disease process, condition or treatment will be avoided or minimized  Outcome: Ongoing     Problem: Isolation Precautions - Risk of Spread of Infection  Goal: Prevent transmission of infection  Outcome: Ongoing     Problem: Nutrition Deficits  Goal: Optimize nutritional status  Outcome: Ongoing     Problem: Risk for Fluid Volume Deficit  Goal: Maintain normal heart rhythm  Outcome: Ongoing  Goal: Maintain absence of muscle cramping  Outcome: Ongoing  Goal: Maintain normal serum potassium, sodium, calcium, phosphorus, and pH  Outcome: Ongoing     Problem: Loneliness or Risk for Loneliness  Goal: Demonstrate positive use of time alone when socialization is not possible  Outcome: Ongoing     Problem: Fatigue  Goal: Verbalize increase energy and improved vitality  Outcome: Ongoing     Problem: Patient Education: Go to Patient

## 2021-11-24 NOTE — PROGRESS NOTES
Patients head to toe assessment completed. Vital signs WNL, call light within reach. Scheduled medications given per MAR. Patient denies any pain at the moment. Patient resting in bed. Will continue to monitor.

## 2021-11-24 NOTE — PROGRESS NOTES
Comprehensive Nutrition Assessment    Type and Reason for Visit:  Initial, Positive Nutrition Screen (decreased appetite, weight loss)    Nutrition Recommendations/Plan:   1. Continue Carb control diet, encourage protein source at each meal and snack  2. Offer Glucerna shakes with meals  3. Will monitor nutritional adequacy, nutrition-related labs, weights, BMs, and clinical progress     Nutrition Assessment:  Patient admitted with pneumonia due to COVID 19 requiring 3 liters nasal cannula. Appetite decreased. Weight has been trending down from 230's to 217 lbs. Currently on a carb control diet eating % meals. Malnutrition Assessment:  Malnutrition Status: At risk for malnutrition (Comment) (recent decrease in appetite, weight trending down since last year 230's down to 217 lbs)      Estimated Daily Nutrient Needs:  Energy (kcal):  1344-5657; Weight Used for Energy Requirements:  Current (98.7 kg)     Protein (g):  118-128; Weight Used for Protein Requirements:  Current (1.2-1.5; 98.7 kg)        Fluid (ml/day):   ; Method Used for Fluid Requirements:  1 ml/kcal      Nutrition Related Findings:  BG elevated at this time-on IV steriods, treated with extensive insulin regimen with history of diabetes; per EMR history in March of 2021-office visit for uncontrolled diabetes; no BM noted yet this admission      Wounds:  None       Current Nutrition Therapies:    ADULT DIET; Regular; 4 carb choices (60 gm/meal)    Anthropometric Measures:  · Height: 5' 7\" (170.2 cm)  · Current Body Weight: 217 lb 9 oz (98.7 kg)   · Admission Body Weight: 236 lb (107 kg)    · Ideal Body Weight: 148 lbs; % Ideal Body Weight     · BMI: 34.1  · BMI Categories: Obese Class 1 (BMI 30.0-34. 9)       Nutrition Diagnosis:   · Increased nutrient needs related to increase demand for energy/nutrients, impaired respiratory function as evidenced by poor intake prior to admission (increased protein needs due to compromised lung function)    Nutrition Interventions:   Food and/or Nutrient Delivery:  Continue Current Diet, Start Oral Nutrition Supplement  Nutrition Education/Counseling:   (address when medically appropriate)   Coordination of Nutrition Care:  Continue to monitor while inpatient    Goals:  Patient will eat 50% or greater of meals and supplements to prevent further weight loss. Nutrition Monitoring and Evaluation:   Behavioral-Environmental Outcomes:      Food/Nutrient Intake Outcomes:  Supplement Intake, Food and Nutrient Intake  Physical Signs/Symptoms Outcomes:  Nutrition Focused Physical Findings, Biochemical Data     Discharge Planning:     Too soon to determine     Electronically signed by Dwayne Butler RD, LD on 11/24/21 at 3:06 PM EST    Contact: [phone]

## 2021-11-25 VITALS
HEART RATE: 79 BPM | SYSTOLIC BLOOD PRESSURE: 134 MMHG | WEIGHT: 217.59 LBS | OXYGEN SATURATION: 92 % | TEMPERATURE: 98.7 F | DIASTOLIC BLOOD PRESSURE: 74 MMHG | RESPIRATION RATE: 18 BRPM | BODY MASS INDEX: 34.15 KG/M2 | HEIGHT: 67 IN

## 2021-11-25 LAB
A/G RATIO: 0.9 (ref 1.1–2.2)
ALBUMIN SERPL-MCNC: 3.6 G/DL (ref 3.4–5)
ALP BLD-CCNC: 129 U/L (ref 40–129)
ALT SERPL-CCNC: 49 U/L (ref 10–40)
ANION GAP SERPL CALCULATED.3IONS-SCNC: 12 MMOL/L (ref 3–16)
AST SERPL-CCNC: 34 U/L (ref 15–37)
BASOPHILS ABSOLUTE: 0 K/UL (ref 0–0.2)
BASOPHILS RELATIVE PERCENT: 0.2 %
BILIRUB SERPL-MCNC: 0.4 MG/DL (ref 0–1)
BUN BLDV-MCNC: 24 MG/DL (ref 7–20)
C-REACTIVE PROTEIN: 43.9 MG/L (ref 0–5.1)
CALCIUM SERPL-MCNC: 9.4 MG/DL (ref 8.3–10.6)
CHLORIDE BLD-SCNC: 103 MMOL/L (ref 99–110)
CO2: 26 MMOL/L (ref 21–32)
CREAT SERPL-MCNC: 1 MG/DL (ref 0.8–1.3)
D DIMER: 214 NG/ML DDU (ref 0–229)
EKG ATRIAL RATE: 102 BPM
EKG DIAGNOSIS: NORMAL
EKG P AXIS: 49 DEGREES
EKG P-R INTERVAL: 144 MS
EKG Q-T INTERVAL: 346 MS
EKG QRS DURATION: 78 MS
EKG QTC CALCULATION (BAZETT): 450 MS
EKG R AXIS: 56 DEGREES
EKG T AXIS: 25 DEGREES
EKG VENTRICULAR RATE: 102 BPM
EOSINOPHILS ABSOLUTE: 0 K/UL (ref 0–0.6)
EOSINOPHILS RELATIVE PERCENT: 0 %
GFR AFRICAN AMERICAN: >60
GFR NON-AFRICAN AMERICAN: >60
GLUCOSE BLD-MCNC: 163 MG/DL (ref 70–99)
GLUCOSE BLD-MCNC: 169 MG/DL (ref 70–99)
GLUCOSE BLD-MCNC: 171 MG/DL (ref 70–99)
GLUCOSE BLD-MCNC: 246 MG/DL (ref 70–99)
HCT VFR BLD CALC: 37 % (ref 40.5–52.5)
HEMOGLOBIN: 12.5 G/DL (ref 13.5–17.5)
LYMPHOCYTES ABSOLUTE: 1.5 K/UL (ref 1–5.1)
LYMPHOCYTES RELATIVE PERCENT: 12 %
MAGNESIUM: 2.2 MG/DL (ref 1.8–2.4)
MCH RBC QN AUTO: 28 PG (ref 26–34)
MCHC RBC AUTO-ENTMCNC: 33.8 G/DL (ref 31–36)
MCV RBC AUTO: 82.8 FL (ref 80–100)
MONOCYTES ABSOLUTE: 1.1 K/UL (ref 0–1.3)
MONOCYTES RELATIVE PERCENT: 8.9 %
NEUTROPHILS ABSOLUTE: 9.8 K/UL (ref 1.7–7.7)
NEUTROPHILS RELATIVE PERCENT: 78.9 %
PDW BLD-RTO: 14.2 % (ref 12.4–15.4)
PERFORMED ON: ABNORMAL
PHOSPHORUS: 4 MG/DL (ref 2.5–4.9)
PLATELET # BLD: 461 K/UL (ref 135–450)
PMV BLD AUTO: 8.1 FL (ref 5–10.5)
POTASSIUM SERPL-SCNC: 4.1 MMOL/L (ref 3.5–5.1)
PROCALCITONIN: 0.07 NG/ML (ref 0–0.15)
RBC # BLD: 4.47 M/UL (ref 4.2–5.9)
SODIUM BLD-SCNC: 141 MMOL/L (ref 136–145)
TOTAL PROTEIN: 7.6 G/DL (ref 6.4–8.2)
WBC # BLD: 12.5 K/UL (ref 4–11)

## 2021-11-25 PROCEDURE — 6360000002 HC RX W HCPCS: Performed by: HOSPITALIST

## 2021-11-25 PROCEDURE — 94761 N-INVAS EAR/PLS OXIMETRY MLT: CPT

## 2021-11-25 PROCEDURE — 6360000002 HC RX W HCPCS: Performed by: NURSE PRACTITIONER

## 2021-11-25 PROCEDURE — 2500000003 HC RX 250 WO HCPCS: Performed by: HOSPITALIST

## 2021-11-25 PROCEDURE — 80053 COMPREHEN METABOLIC PANEL: CPT

## 2021-11-25 PROCEDURE — 85025 COMPLETE CBC W/AUTO DIFF WBC: CPT

## 2021-11-25 PROCEDURE — 36415 COLL VENOUS BLD VENIPUNCTURE: CPT

## 2021-11-25 PROCEDURE — 2580000003 HC RX 258: Performed by: HOSPITALIST

## 2021-11-25 PROCEDURE — 84100 ASSAY OF PHOSPHORUS: CPT

## 2021-11-25 PROCEDURE — 6370000000 HC RX 637 (ALT 250 FOR IP): Performed by: NURSE PRACTITIONER

## 2021-11-25 PROCEDURE — 84145 PROCALCITONIN (PCT): CPT

## 2021-11-25 PROCEDURE — 94640 AIRWAY INHALATION TREATMENT: CPT

## 2021-11-25 PROCEDURE — 85379 FIBRIN DEGRADATION QUANT: CPT

## 2021-11-25 PROCEDURE — 93010 ELECTROCARDIOGRAM REPORT: CPT | Performed by: INTERNAL MEDICINE

## 2021-11-25 PROCEDURE — 83735 ASSAY OF MAGNESIUM: CPT

## 2021-11-25 PROCEDURE — 86140 C-REACTIVE PROTEIN: CPT

## 2021-11-25 PROCEDURE — 2700000000 HC OXYGEN THERAPY PER DAY

## 2021-11-25 PROCEDURE — 2580000003 HC RX 258: Performed by: INTERNAL MEDICINE

## 2021-11-25 PROCEDURE — 94680 O2 UPTK RST&XERS DIR SIMPLE: CPT

## 2021-11-25 PROCEDURE — 6370000000 HC RX 637 (ALT 250 FOR IP): Performed by: INTERNAL MEDICINE

## 2021-11-25 RX ORDER — LISINOPRIL 20 MG/1
20 TABLET ORAL DAILY
Qty: 30 TABLET | Refills: 2 | Status: SHIPPED | OUTPATIENT
Start: 2021-11-25

## 2021-11-25 RX ORDER — DEXAMETHASONE 6 MG/1
6 TABLET ORAL
Qty: 6 TABLET | Refills: 0 | Status: SHIPPED | OUTPATIENT
Start: 2021-11-26 | End: 2021-12-02

## 2021-11-25 RX ADMIN — Medication 2000 UNITS: at 08:09

## 2021-11-25 RX ADMIN — ENOXAPARIN SODIUM 30 MG: 30 INJECTION SUBCUTANEOUS at 09:18

## 2021-11-25 RX ADMIN — AMLODIPINE BESYLATE 5 MG: 5 TABLET ORAL at 08:09

## 2021-11-25 RX ADMIN — LISINOPRIL 20 MG: 20 TABLET ORAL at 08:09

## 2021-11-25 RX ADMIN — INSULIN LISPRO 5 UNITS: 100 INJECTION, SOLUTION INTRAVENOUS; SUBCUTANEOUS at 11:47

## 2021-11-25 RX ADMIN — Medication 2 PUFF: at 10:03

## 2021-11-25 RX ADMIN — INSULIN LISPRO 3 UNITS: 100 INJECTION, SOLUTION INTRAVENOUS; SUBCUTANEOUS at 08:00

## 2021-11-25 RX ADMIN — Medication 10 ML: at 08:10

## 2021-11-25 RX ADMIN — REMDESIVIR 100 MG: 100 INJECTION, POWDER, LYOPHILIZED, FOR SOLUTION INTRAVENOUS at 09:16

## 2021-11-25 RX ADMIN — DEXAMETHASONE SODIUM PHOSPHATE 20 MG: 4 INJECTION, SOLUTION INTRA-ARTICULAR; INTRALESIONAL; INTRAMUSCULAR; INTRAVENOUS; SOFT TISSUE at 09:16

## 2021-11-25 RX ADMIN — INSULIN LISPRO 5 UNITS: 100 INJECTION, SOLUTION INTRAVENOUS; SUBCUTANEOUS at 08:00

## 2021-11-25 RX ADMIN — INSULIN LISPRO 6 UNITS: 100 INJECTION, SOLUTION INTRAVENOUS; SUBCUTANEOUS at 11:47

## 2021-11-25 NOTE — PLAN OF CARE
Problem: Pain:  Goal: Pain level will decrease  Description: Pain level will decrease  Outcome: Ongoing  Goal: Control of acute pain  Description: Control of acute pain  Outcome: Ongoing  Goal: Control of chronic pain  Description: Control of chronic pain  Outcome: Ongoing     Problem: Falls - Risk of:  Goal: Will remain free from falls  Description: Will remain free from falls  Outcome: Ongoing  Goal: Absence of physical injury  Description: Absence of physical injury  Outcome: Ongoing     Problem: Airway Clearance - Ineffective  Goal: Achieve or maintain patent airway  Outcome: Ongoing     Problem: Gas Exchange - Impaired  Goal: Absence of hypoxia  Outcome: Ongoing  Goal: Promote optimal lung function  Outcome: Ongoing     Problem: Breathing Pattern - Ineffective  Goal: Ability to achieve and maintain a regular respiratory rate  Outcome: Ongoing     Problem:  Body Temperature -  Risk of, Imbalanced  Goal: Ability to maintain a body temperature within defined limits  Outcome: Ongoing  Goal: Will regain or maintain usual level of consciousness  Outcome: Ongoing  Goal: Complications related to the disease process, condition or treatment will be avoided or minimized  Outcome: Ongoing     Problem: Isolation Precautions - Risk of Spread of Infection  Goal: Prevent transmission of infection  Outcome: Ongoing     Problem: Nutrition Deficits  Goal: Optimize nutritional status  Outcome: Ongoing     Problem: Risk for Fluid Volume Deficit  Goal: Maintain normal heart rhythm  Outcome: Ongoing  Goal: Maintain absence of muscle cramping  Outcome: Ongoing  Goal: Maintain normal serum potassium, sodium, calcium, phosphorus, and pH  Outcome: Ongoing     Problem: Loneliness or Risk for Loneliness  Goal: Demonstrate positive use of time alone when socialization is not possible  Outcome: Ongoing     Problem: Fatigue  Goal: Verbalize increase energy and improved vitality  Outcome: Ongoing     Problem: Patient Education: Go to Patient Education Activity  Goal: Patient/Family Education  Outcome: Ongoing     Problem: Nutrition  Goal: Optimal nutrition therapy  11/24/2021 2256 by Joo Kaminski RN  Outcome: Ongoing

## 2021-11-25 NOTE — PROGRESS NOTES
11/25/21 1031   Resting (Room Air)   SpO2 90   HR 98   Resting (On O2)   SpO2 97   HR 86   O2 Device Nasal cannula   O2 Flow Rate (l/min) 1 l/min   During Walk (Room Air)   SpO2 89   HR 98   Rate of Dyspnea 0   Comments During walk test the lowest the pt sats dropped to is 89% but came right back up to 90-91% with no SOB or WOB.    During Walk (On O2)   Need Additional O2 Flow Rate Rows No   After Walk   Does the Patient Qualify for Home O2 No   Does the Patient Need Portable Oxygen Tanks No

## 2021-11-25 NOTE — DISCHARGE SUMMARY
[facility]    Patient Demographics    Patient. Myra Dates  Date of Birth. 1960  MRN. [id number]     Primary care provider. Cortney Brito MD  (Tel: [phone])    Admit date: 11/22/2021    Discharge date 11/25/2021  Note Date: 11/25/2021     Reason for Hospitalization. Chief Complaint   Patient presents with    Generalized Body Aches     Patient in with complaints of feeling ill for four days. States he doesnt feel like eating, body aches, weakness, fevers, chills. Denies being around anyone with covid. Significant Findings. Active Problems:    Pneumonia due to COVID-19 virus  Resolved Problems:    * No resolved hospital problems. *       Problems and results from this hospitalization that need follow up. 1. COVID  2. Uncontrolled T2DM  AIC 7.8  3. Elevated BP during his stay. ( pt reported he had been out of his home meds)     Significant test results and incidental findings. AIC 7.8    D dimer 1258 , 310 - 214  - 82 - 43        Invasive procedures and treatments. [facility] Course. The patient was admitted with acute  Hypoxic respiratory failure through the Ed due to [place]. He is unvaccinated . He was admitted and treated with IV decadron, remdesevir and supplemental oxygen. He was weaned off oxygen prior to his discharge home. He was prescribed six additional days of daily decadron to complete a ten day course of therapy. HIs inflammatory markers were trending down prior to his discharge. He was feeling better at the time of d/c and was eager to be released. HTN:  bp was elevated at intervals. Needs follow up and probable additional therapy  T2DM:  Steroid induced hyperglycemia was controlled with humalog and lantus. AIC 7.8     Consults.   IP CONSULT TO HOSPITALIST  IP CONSULT TO PALLIATIVE CARE    Physical examination on discharge day. /74   Pulse 79   Temp 98.7 °F (37.1 °C) (Oral)   Resp 18   Ht 5' 7\" (1.702 m)   Wt 217 lb 9.5 oz (98.7 kg)   SpO2 92%   BMI 34.08 kg/m²   General appearance. Alert. Looks comfortable. HEENT. Sclera clear. Moist mucus membranes. Cardiovascular. Regular rate and rhythm, normal S1, S2. No murmur. Respiratory. Not using accessory muscles. Clear to auscultation bilaterally, no wheeze. Gastrointestinal. Abdomen soft, non-tender, not distended, normal bowel sounds  Neurology. Facial symmetry. No speech deficits. Moving all extremities equally. Extremities. No edema in lower extremities. Skin. Warm, dry, normal turgor    Condition at time of discharge stable     Medication instructions provided to patient at discharge. Medication List      START taking these medications    dexamethasone 6 MG tablet  Commonly known as: DECADRON  Take 1 tablet by mouth daily (with breakfast) for 6 days  Start taking on: November 26, 2021        Michele Fernandez taking these medications    Accu-Chek Sera Connect w/Device Kit  As directed     Accu-Chek Sera Plus strip  Generic drug: blood glucose test strips  1 each by In Vitro route 3 times daily As needed.      Accu-Chek Multiclix Lancet Dev Kit  As directed     atorvastatin 20 MG tablet  Commonly known as: LIPITOR  Take 1 tablet by mouth nightly     Insulin Pen Needle 32G X 4 MM Misc  1 each by Does not apply route daily     Lancets Misc  1 each by Does not apply route 3 times daily     Lantus SoloStar 100 UNIT/ML injection pen  Generic drug: insulin glargine  Inject 35 Units into the skin nightly     lisinopril 20 MG tablet  Commonly known as: PRINIVIL;ZESTRIL  Take 1 tablet by mouth daily     metFORMIN 500 MG tablet  Commonly known as: GLUCOPHAGE  Take 1 tablet by mouth 2 times daily (with meals)           Where to Get Your Medications      These medications were sent to [address] [place] [address] [phone] - [address] [facility]    Phone: [phone]   · dexamethasone 6 MG tablet  · lisinopril 20 MG tablet         Discharge recommendations given to patient. Follow Up. in 1 week   Disposition. Home   Activity. As tolerated   Diet: ADULT DIET; Regular; 4 carb choices (60 gm/meal)  ADULT ORAL NUTRITION SUPPLEMENT; Breakfast, Dinner; Diabetic Oral Supplement      Spent > 30  minutes in discharge process.     Signed:  Lennox German, APRN - CNP     11/25/2021 12:36 PM

## 2021-11-25 NOTE — PROGRESS NOTES
Patients head to toe assessment completed. Vital signs WNL, call light within reach. Scheduled medications given per MAR. Patient denies any pain at the moment. Patient up in a chair. Will continue to monitor.

## 2021-11-26 ENCOUNTER — CARE COORDINATION (OUTPATIENT)
Dept: CASE MANAGEMENT | Age: 61
End: 2021-11-26

## 2021-11-26 LAB
BLOOD CULTURE, ROUTINE: NORMAL
CULTURE, BLOOD 2: NORMAL

## 2021-11-26 NOTE — CARE COORDINATION
E[facility] Transitions Initial Follow Up Call    Call within 2 business days of discharge: No    Patient: Whitney Steel Patient : 1960   MRN: [id number]  Reason for Admission:   Discharge Date: 21 RARS: Readmission Risk Score: 10 ( )      Last Discharge [facility]       Complaint Diagnosis Description Type Department Provider    21 Generalized Body Aches Acute hypoxemic respiratory failure ([facility] [address] . .. ED to Hosp-Admission (Discharged) (ADMITTED) LINDSEYZ 5T Yovany Dougherty MD; Veto Gusman. .. Initial 24 hr call attempted, contact info left on vm      Follow Up  No future appointments.     Ani Ellison RN

## 2021-11-29 ENCOUNTER — CARE COORDINATION (OUTPATIENT)
Dept: CASE MANAGEMENT | Age: 61
End: 2021-11-29

## 2021-11-29 NOTE — CARE COORDINATION
Eliud [facility] Transitions Initial Follow Up Call    Call within 2 business days of discharge: No    Patient: Ula Goodpasture Patient : 1960   MRN: [id number]  Reason for Admission:   Discharge Date: 21 RARS: Readmission Risk Score: 10 ( )      Last Discharge [facility]       Complaint Diagnosis Description Type Department Provider    21 Generalized Body Aches Acute hypoxemic respiratory failure ([facility] [address] . .. ED to Hosp-Admission (Discharged) (ADMITTED) EFREN 5T Sara Grossman MD; Maris Alexis. ..           2nd and final attempt at an initial 24 hour call, contact info left on vm      Follow Up  No future appointments.     [place] RN

## 2022-11-16 ENCOUNTER — HOSPITAL ENCOUNTER (EMERGENCY)
Age: 62
Discharge: HOME | End: 2022-11-16
Attending: EMERGENCY MEDICINE
Payer: COMMERCIAL

## 2022-11-16 VITALS
HEART RATE: 65 BPM | BODY MASS INDEX: 32.96 KG/M2 | TEMPERATURE: 97.9 F | DIASTOLIC BLOOD PRESSURE: 91 MMHG | WEIGHT: 210 LBS | RESPIRATION RATE: 17 BRPM | OXYGEN SATURATION: 100 % | SYSTOLIC BLOOD PRESSURE: 153 MMHG | HEIGHT: 67 IN

## 2022-11-16 DIAGNOSIS — R53.83 FATIGUE, UNSPECIFIED TYPE: ICD-10-CM

## 2022-11-16 DIAGNOSIS — I10 HYPERTENSION, UNSPECIFIED TYPE: ICD-10-CM

## 2022-11-16 DIAGNOSIS — I16.0 HYPERTENSIVE URGENCY: Primary | ICD-10-CM

## 2022-11-16 DIAGNOSIS — E11.00 DM HYPEROSMOLARITY TYPE II, UNCONTROLLED (HCC): ICD-10-CM

## 2022-11-16 DIAGNOSIS — E11.65 DM HYPEROSMOLARITY TYPE II, UNCONTROLLED (HCC): ICD-10-CM

## 2022-11-16 DIAGNOSIS — E78.00 HIGH CHOLESTEROL: ICD-10-CM

## 2022-11-16 LAB
A/G RATIO: 1.2 (ref 1.1–2.2)
ALBUMIN SERPL-MCNC: 4.2 G/DL (ref 3.4–5)
ALP BLD-CCNC: 94 U/L (ref 40–129)
ALT SERPL-CCNC: 30 U/L (ref 10–40)
ANION GAP SERPL CALCULATED.3IONS-SCNC: 14 MMOL/L (ref 3–16)
AST SERPL-CCNC: 31 U/L (ref 15–37)
BASOPHILS ABSOLUTE: 0.1 K/UL (ref 0–0.2)
BASOPHILS RELATIVE PERCENT: 1.1 %
BILIRUB SERPL-MCNC: 0.5 MG/DL (ref 0–1)
BUN BLDV-MCNC: 13 MG/DL (ref 7–20)
CALCIUM SERPL-MCNC: 9.4 MG/DL (ref 8.3–10.6)
CHLORIDE BLD-SCNC: 105 MMOL/L (ref 99–110)
CO2: 21 MMOL/L (ref 21–32)
CREAT SERPL-MCNC: 0.9 MG/DL (ref 0.8–1.3)
EOSINOPHILS ABSOLUTE: 0.1 K/UL (ref 0–0.6)
EOSINOPHILS RELATIVE PERCENT: 2.5 %
GFR SERPL CREATININE-BSD FRML MDRD: >60 ML/MIN/{1.73_M2}
GLUCOSE BLD-MCNC: 111 MG/DL (ref 70–99)
GLUCOSE BLD-MCNC: 120 MG/DL (ref 70–99)
HCT VFR BLD CALC: 39.3 % (ref 40.5–52.5)
HEMOGLOBIN: 13.3 G/DL (ref 13.5–17.5)
LYMPHOCYTES ABSOLUTE: 1.3 K/UL (ref 1–5.1)
LYMPHOCYTES RELATIVE PERCENT: 28.7 %
MCH RBC QN AUTO: 28.1 PG (ref 26–34)
MCHC RBC AUTO-ENTMCNC: 33.9 G/DL (ref 31–36)
MCV RBC AUTO: 82.9 FL (ref 80–100)
MONOCYTES ABSOLUTE: 0.3 K/UL (ref 0–1.3)
MONOCYTES RELATIVE PERCENT: 7.3 %
NEUTROPHILS ABSOLUTE: 2.7 K/UL (ref 1.7–7.7)
NEUTROPHILS RELATIVE PERCENT: 60.4 %
PDW BLD-RTO: 14.3 % (ref 12.4–15.4)
PERFORMED ON: ABNORMAL
PLATELET # BLD: 243 K/UL (ref 135–450)
PMV BLD AUTO: 8.8 FL (ref 5–10.5)
POTASSIUM REFLEX MAGNESIUM: 4.2 MMOL/L (ref 3.5–5.1)
PRO-BNP: 12 PG/ML (ref 0–124)
RAPID INFLUENZA  B AGN: NEGATIVE
RAPID INFLUENZA A AGN: NEGATIVE
RBC # BLD: 4.74 M/UL (ref 4.2–5.9)
SARS-COV-2, NAAT: NOT DETECTED
SODIUM BLD-SCNC: 140 MMOL/L (ref 136–145)
TOTAL PROTEIN: 7.6 G/DL (ref 6.4–8.2)
TROPONIN: <0.01 NG/ML
WBC # BLD: 4.6 K/UL (ref 4–11)

## 2022-11-16 PROCEDURE — 93005 ELECTROCARDIOGRAM TRACING: CPT | Performed by: EMERGENCY MEDICINE

## 2022-11-16 PROCEDURE — 80053 COMPREHEN METABOLIC PANEL: CPT

## 2022-11-16 PROCEDURE — 87635 SARS-COV-2 COVID-19 AMP PRB: CPT

## 2022-11-16 PROCEDURE — 6370000000 HC RX 637 (ALT 250 FOR IP): Performed by: EMERGENCY MEDICINE

## 2022-11-16 PROCEDURE — 85025 COMPLETE CBC W/AUTO DIFF WBC: CPT

## 2022-11-16 PROCEDURE — 87804 INFLUENZA ASSAY W/OPTIC: CPT

## 2022-11-16 PROCEDURE — 84443 ASSAY THYROID STIM HORMONE: CPT

## 2022-11-16 PROCEDURE — 84484 ASSAY OF TROPONIN QUANT: CPT

## 2022-11-16 PROCEDURE — 99284 EMERGENCY DEPT VISIT MOD MDM: CPT

## 2022-11-16 PROCEDURE — 83880 ASSAY OF NATRIURETIC PEPTIDE: CPT

## 2022-11-16 RX ORDER — LISINOPRIL 10 MG/1
10 TABLET ORAL ONCE
Status: COMPLETED | OUTPATIENT
Start: 2022-11-16 | End: 2022-11-16

## 2022-11-16 RX ORDER — LISINOPRIL 20 MG/1
20 TABLET ORAL DAILY
Qty: 30 TABLET | Refills: 0 | Status: SHIPPED | OUTPATIENT
Start: 2022-11-16 | End: 2022-11-18 | Stop reason: SDUPTHER

## 2022-11-16 RX ADMIN — LISINOPRIL 10 MG: 10 TABLET ORAL at 10:30

## 2022-11-16 ASSESSMENT — PAIN SCALES - GENERAL: PAINLEVEL_OUTOF10: 5

## 2022-11-16 ASSESSMENT — PAIN DESCRIPTION - LOCATION: LOCATION: SHOULDER

## 2022-11-16 ASSESSMENT — PAIN - FUNCTIONAL ASSESSMENT: PAIN_FUNCTIONAL_ASSESSMENT: 0-10

## 2022-11-16 ASSESSMENT — LIFESTYLE VARIABLES: HOW OFTEN DO YOU HAVE A DRINK CONTAINING ALCOHOL: NEVER

## 2022-11-16 ASSESSMENT — PAIN DESCRIPTION - ORIENTATION: ORIENTATION: LEFT

## 2022-11-16 NOTE — DISCHARGE INSTRUCTIONS
Please return if you have any new, worsening, or concerning symptoms like inability to eat/drink/walk, fevers, chest pain, trouble breathing     Contact your primary care physician tomorrow to make a follow up appointment this/next week.

## 2022-11-16 NOTE — ED PROVIDER NOTES
[address] PROVIDER NOTE    Patient Identification  Pt Name: Raji Durbin  MRN: [id number]  [place] 1960  Date of evaluation: 11/16/2022  Provider: Veto Puente MD  PCP: Josephine Cruz MD    Chief Complaint  Fatigue (And not feeling like self  since Monday. -n/v/d  - cough  not taking medicines because he can't afford it for 3-4 months)      HPI  History provided by patient   This is a 60 y.o. male who presents to the ED for fatigue. Has been ongoing for the past couple days. No nausea or vomiting or diarrhea. No cough. No chest pain or shortness of breath. No dysuria. Never had this before. Thought that his blood sugar would be real high because he has been out of his medications for the past 3 to 4 months. He has been in contact with the  was working on getting him his medications back. No recent illness. No pain anywhere. He thought that he had an episode of palpitations earlier. Yanelis Paez ROS  12 systems reviewed, pertinent positives/negatives per HPI otherwise noted to be negative. I have reviewed the following nursing documentation:  Allergies: Patient has no known allergies. Past medical history:   Past Medical History:   Diagnosis Date    Essential hypertension     Hyperlipidemia     Uncontrolled type 2 diabetes mellitus with hyperglycemia ([facility] [address]      Past surgical history: History reviewed. No pertinent surgical history.     Home medications:   Discharge Medication List as of 11/16/2022 10:45 AM        CONTINUE these medications which have NOT CHANGED    Details   atorvastatin (LIPITOR) 20 MG tablet Take 1 tablet by mouth nightly, Disp-30 tablet, R-3Normal      insulin glargine (LANTUS SOLOSTAR) 100 UNIT/ML injection pen Inject 35 Units into the skin nightly, Disp-5 pen, R-3Normal      Insulin Pen Needle 32G X 4 MM MISC DAILY Starting Fri 3/19/2021, Disp-100 each, R-3, Normal      Blood Glucose Monitoring Suppl (ACCU-CHEK SAM CONNECT) w/Device KIT As directed, Disp-1 kit, R-1Normal      blood glucose test strips (ACCU-CHEK SAM PLUS) strip 1 each by In Vitro route 3 times daily As needed. , Disp-100 each,R-3Normal      Lancets MISC 3 TIMES DAILY Starting Fri 12/11/2020, Disp-200 each,R-5, Normal      Lancets Misc. (ACCU-CHEK MULTICLIX LANCET DEV) KIT Disp-1 kit,R-0, NormalAs directed             Social history:  reports that he has never smoked. He has never used smokeless tobacco. He reports that he does not drink alcohol and does not use drugs. Family history:    Family History   Family history unknown: Yes         Exam  ED Triage Vitals [11/16/22 0715]   BP Temp Temp Source Heart Rate Resp SpO2 Height Weight   (!) 199/98 97.9 °F (36.6 °C) Oral 72 16 97 % 5' 7\" (1.702 m) 210 lb (95.3 kg)     Nursing note and vitals reviewed. Constitutional: In no acute distress  HENT:      Head: Normocephalic      Ears: External ears normal.      Nose: Nose normal.     Mouth: Membrane mucosa moist   Eyes: No discharge. Neck: Supple. Trachea midline. Cardiovascular: Regular rate. Warm extremities  Pulmonary/Chest: Effort normal. No respiratory distress. Clear to auscultation bilaterally  Abdominal: Soft. No distension. Nontender  : Deferred  Rectal: Deferred   Musculoskeletal: Moves all extremities. No gross deformity. Neurological: Alert and oriented. Face symmetric. Speech is clear. Cranial nerves II to XII intact. Normal finger-nose. Normal strength and sensation bilateral upper and lower extremities. Skin: Warm and dry. Psychiatric: Normal mood and affect. Behavior is normal.    Procedures      EKG  The Ekg interpreted by me in the absence of a cardiologist shows. Normal sinus rhythm. No specific ST changes appreciated.   Nonspecific t wave abn  HR 63  No significant change from prior EKG dated 11 mon ago      Radiology  No orders to display       Labs  Results for orders placed or performed during the hospital encounter of 11/16/22 COVID-19, Rapid    Specimen: Nasopharyngeal Swab   Result Value Ref Range    SARS-CoV-2, NAAT Not Detected Not Detected   Rapid influenza A/B antigens    Specimen: Nasopharyngeal   Result Value Ref Range    Rapid Influenza A Ag Negative Negative    Rapid Influenza B Ag Negative Negative   CBC with Auto Differential   Result Value Ref Range    WBC 4.6 4.0 - 11.0 K/uL    RBC 4.74 4.20 - 5.90 M/uL    Hemoglobin 13.3 (L) 13.5 - 17.5 g/dL    Hematocrit 39.3 (L) 40.5 - 52.5 %    MCV 82.9 80.0 - 100.0 fL    MCH 28.1 26.0 - 34.0 pg    MCHC 33.9 31.0 - 36.0 g/dL    RDW 14.3 12.4 - 15.4 %    Platelets 901 923 - 393 K/uL    MPV 8.8 5.0 - 10.5 fL    Neutrophils % 60.4 %    Lymphocytes % 28.7 %    Monocytes % 7.3 %    Eosinophils % 2.5 %    Basophils % 1.1 %    Neutrophils Absolute 2.7 1.7 - 7.7 K/uL    Lymphocytes Absolute 1.3 1.0 - 5.1 K/uL    Monocytes Absolute 0.3 0.0 - 1.3 K/uL    Eosinophils Absolute 0.1 0.0 - 0.6 K/uL    Basophils Absolute 0.1 0.0 - 0.2 K/uL   CMP w/ Reflex to MG   Result Value Ref Range    Sodium 140 136 - 145 mmol/L    Potassium reflex Magnesium 4.2 3.5 - 5.1 mmol/L    Chloride 105 99 - 110 mmol/L    CO2 21 21 - 32 mmol/L    Anion Gap 14 3 - 16    Glucose 120 (H) 70 - 99 mg/dL    BUN 13 7 - 20 mg/dL    Creatinine 0.9 0.8 - 1.3 mg/dL    Est, Glom Filt Rate >60 >60    Calcium 9.4 8.3 - 10.6 mg/dL    Total Protein 7.6 6.4 - 8.2 g/dL    Albumin 4.2 3.4 - 5.0 g/dL    Albumin/Globulin Ratio 1.2 1.1 - 2.2    Total Bilirubin 0.5 0.0 - 1.0 mg/dL    Alkaline Phosphatase 94 40 - 129 U/L    ALT 30 10 - 40 U/L    AST 31 15 - 37 U/L   Troponin   Result Value Ref Range    Troponin <0.01 <0.01 ng/mL   Brain Natriuretic Peptide   Result Value Ref Range    Pro-BNP 12 0 - 124 pg/mL   POCT Glucose   Result Value Ref Range    POC Glucose 111 (H) 70 - 99 mg/dl    Performed on ACCU-CHEK    EKG 12 Lead   Result Value Ref Range    Ventricular Rate 63 BPM    Atrial Rate 63 BPM    P-R Interval 170 ms    QRS Duration 86 ms Q-T Interval 396 ms    QTc Calculation (Bazett) 405 ms    P Axis 116 degrees    R Axis 62 degrees    T Axis 71 degrees    Diagnosis       Normal sinus rhythmMinimal voltage criteria for LVH, may be normal variantNonspecific T wave abnormalityAbnormal ECG       Screenings   Karan Coma Scale  Eye Opening: Spontaneous  Best Verbal Response: Oriented  Best Motor Response: Obeys commands  [place] Coma Scale Score: 15       MDM and ED Course  This is a 60 y.o. male who presents to the ED for fatigue. Not having any chest pain or shortness of breath to indicate ACS, pulmonary embolism, or dissection. Not having any infectious symptoms. Will obtain COVID/influenza testing. He is quite hypertensive here. Systolic blood pressure in 180s. Has been off of his medications. He did get connected with a  who is giving him assistance to get these medications. We will check for signs of endorgan damage. He did have an episode of palpitations earlier. Will obtain EKG.     --------------    Blood pressure improved with patient's home lisinopril. No signs of endorgan damage. He is feeling well. Discharged with all questions answered and prescription filled. [unfilled]    Is this patient to be included in the SEP-1 Core Measure due to severe sepsis or septic shock? No   Exclusion criteria - the patient is NOT to be included for SEP-1 Core Measure due to: Infection is not suspected        Final Impression  1. Hypertensive urgency    2. Fatigue, unspecified type    3. High cholesterol    4. DM hyperosmolarity type II, uncontrolled ([address]    5. Hypertension, unspecified type        Blood pressure (!) 153/91, pulse 65, temperature 97.9 °F (36.6 °C), temperature source Oral, resp. rate 17, height 5' 7\" (1.702 m), weight 210 lb (95.3 kg), SpO2 100 %.      Disposition:  DISPOSITION Decision To Discharge 11/16/2022 10:28:39 AM      Patient Referrals:  [facility] [id number]  [phone]    In 1 day      Discharge Medications:  Discharge Medication List as of 11/16/2022 10:45 AM          Discontinued Medications:  Discharge Medication List as of 11/16/2022 10:45 AM          This chart was generated using the [address] [place] [address] dictation system. I created this record but it may contain dictation errors given the limitations of this technology.         Mike Castro MD  11/16/22 9501

## 2022-11-17 LAB
EKG ATRIAL RATE: 63 BPM
EKG DIAGNOSIS: NORMAL
EKG P AXIS: 116 DEGREES
EKG P-R INTERVAL: 170 MS
EKG Q-T INTERVAL: 396 MS
EKG QRS DURATION: 86 MS
EKG QTC CALCULATION (BAZETT): 405 MS
EKG R AXIS: 62 DEGREES
EKG T AXIS: 71 DEGREES
EKG VENTRICULAR RATE: 63 BPM
TSH SERPL DL<=0.05 MIU/L-ACNC: 1.13 UIU/ML (ref 0.27–4.2)

## 2022-11-17 PROCEDURE — 93010 ELECTROCARDIOGRAM REPORT: CPT | Performed by: INTERNAL MEDICINE

## 2022-12-13 ENCOUNTER — HOSPITAL ENCOUNTER (EMERGENCY)
Age: 62
Discharge: HOME | End: 2022-12-13
Attending: STUDENT IN AN ORGANIZED HEALTH CARE EDUCATION/TRAINING PROGRAM
Payer: COMMERCIAL

## 2022-12-13 VITALS
TEMPERATURE: 98.5 F | DIASTOLIC BLOOD PRESSURE: 86 MMHG | HEART RATE: 61 BPM | SYSTOLIC BLOOD PRESSURE: 118 MMHG | OXYGEN SATURATION: 95 % | BODY MASS INDEX: 32.71 KG/M2 | WEIGHT: 208.4 LBS | HEIGHT: 67 IN | RESPIRATION RATE: 18 BRPM

## 2022-12-13 DIAGNOSIS — F41.1 ANXIETY STATE: Primary | ICD-10-CM

## 2022-12-13 LAB
ANION GAP SERPL CALCULATED.3IONS-SCNC: 10 MMOL/L (ref 3–16)
BASOPHILS ABSOLUTE: 0.1 K/UL (ref 0–0.2)
BASOPHILS RELATIVE PERCENT: 1 %
BUN BLDV-MCNC: 20 MG/DL (ref 7–20)
CALCIUM SERPL-MCNC: 9.2 MG/DL (ref 8.3–10.6)
CHLORIDE BLD-SCNC: 107 MMOL/L (ref 99–110)
CO2: 25 MMOL/L (ref 21–32)
CREAT SERPL-MCNC: 1.2 MG/DL (ref 0.8–1.3)
EKG ATRIAL RATE: 55 BPM
EKG DIAGNOSIS: NORMAL
EKG P AXIS: -15 DEGREES
EKG P-R INTERVAL: 174 MS
EKG Q-T INTERVAL: 424 MS
EKG QRS DURATION: 92 MS
EKG QTC CALCULATION (BAZETT): 405 MS
EKG R AXIS: 54 DEGREES
EKG T AXIS: 47 DEGREES
EKG VENTRICULAR RATE: 55 BPM
EOSINOPHILS ABSOLUTE: 0.1 K/UL (ref 0–0.6)
EOSINOPHILS RELATIVE PERCENT: 2.1 %
GFR SERPL CREATININE-BSD FRML MDRD: >60 ML/MIN/{1.73_M2}
GLUCOSE BLD-MCNC: 119 MG/DL (ref 70–99)
HCT VFR BLD CALC: 40.9 % (ref 40.5–52.5)
HEMOGLOBIN: 13.7 G/DL (ref 13.5–17.5)
LYMPHOCYTES ABSOLUTE: 1.5 K/UL (ref 1–5.1)
LYMPHOCYTES RELATIVE PERCENT: 29.2 %
MAGNESIUM: 1.8 MG/DL (ref 1.8–2.4)
MCH RBC QN AUTO: 28 PG (ref 26–34)
MCHC RBC AUTO-ENTMCNC: 33.4 G/DL (ref 31–36)
MCV RBC AUTO: 83.7 FL (ref 80–100)
MONOCYTES ABSOLUTE: 0.3 K/UL (ref 0–1.3)
MONOCYTES RELATIVE PERCENT: 6.2 %
NEUTROPHILS ABSOLUTE: 3.1 K/UL (ref 1.7–7.7)
NEUTROPHILS RELATIVE PERCENT: 61.5 %
PDW BLD-RTO: 13.9 % (ref 12.4–15.4)
PLATELET # BLD: 247 K/UL (ref 135–450)
PMV BLD AUTO: 9 FL (ref 5–10.5)
POTASSIUM REFLEX MAGNESIUM: 4 MMOL/L (ref 3.5–5.1)
RBC # BLD: 4.88 M/UL (ref 4.2–5.9)
SODIUM BLD-SCNC: 142 MMOL/L (ref 136–145)
TROPONIN: <0.01 NG/ML
WBC # BLD: 5.1 K/UL (ref 4–11)

## 2022-12-13 PROCEDURE — 85025 COMPLETE CBC W/AUTO DIFF WBC: CPT

## 2022-12-13 PROCEDURE — 99284 EMERGENCY DEPT VISIT MOD MDM: CPT

## 2022-12-13 PROCEDURE — 83735 ASSAY OF MAGNESIUM: CPT

## 2022-12-13 PROCEDURE — 80048 BASIC METABOLIC PNL TOTAL CA: CPT

## 2022-12-13 PROCEDURE — 93010 ELECTROCARDIOGRAM REPORT: CPT | Performed by: INTERNAL MEDICINE

## 2022-12-13 PROCEDURE — 6370000000 HC RX 637 (ALT 250 FOR IP): Performed by: STUDENT IN AN ORGANIZED HEALTH CARE EDUCATION/TRAINING PROGRAM

## 2022-12-13 PROCEDURE — 84484 ASSAY OF TROPONIN QUANT: CPT

## 2022-12-13 PROCEDURE — 93005 ELECTROCARDIOGRAM TRACING: CPT | Performed by: STUDENT IN AN ORGANIZED HEALTH CARE EDUCATION/TRAINING PROGRAM

## 2022-12-13 RX ORDER — HYDROXYZINE PAMOATE 25 MG/1
25 CAPSULE ORAL 3 TIMES DAILY PRN
Qty: 28 CAPSULE | Refills: 0 | Status: SHIPPED | OUTPATIENT
Start: 2022-12-13 | End: 2023-01-10

## 2022-12-13 RX ORDER — HYDROXYZINE PAMOATE 25 MG/1
50 CAPSULE ORAL ONCE
Status: COMPLETED | OUTPATIENT
Start: 2022-12-13 | End: 2022-12-13

## 2022-12-13 RX ADMIN — HYDROXYZINE PAMOATE 50 MG: 25 CAPSULE ORAL at 07:47

## 2022-12-13 ASSESSMENT — ENCOUNTER SYMPTOMS
CONSTIPATION: 0
BACK PAIN: 0
CHEST TIGHTNESS: 0
SORE THROAT: 0
SHORTNESS OF BREATH: 0
BLOOD IN STOOL: 0
ABDOMINAL PAIN: 0

## 2022-12-13 ASSESSMENT — PAIN - FUNCTIONAL ASSESSMENT: PAIN_FUNCTIONAL_ASSESSMENT: NONE - DENIES PAIN

## 2022-12-13 NOTE — ED PROVIDER NOTES
[address] [facility]      Pt Name: Bette Woods  MRN: [id number]  [place] 1960  Date of evaluation: 12/13/2022  Provider: Alonzo Marie MD    CHIEF COMPLAINT       Chief Complaint   Patient presents with    Hypertension    Panic Attack     Pt is having panic attacks for the past couple of weeks along with htn. Pcp referred pt to the ed if panic attacks continue per pt. HISTORY OF PRESENT ILLNESS   (Location/Symptom, Timing/Onset, Context/Setting, Quality, Duration, Modifying Factors, Severity)  Note limiting factors. Bette Woods is a 60 y.o. male who presents to the emergency department with what he describes as panic attacks. Patient reports episodes of feeling anxious with heart racing sensation and feeling jittery and restless. He reports 5 episodes total in the last 1 week. His PCP recommended changing his metoprolol dosing about 1 week ago which has helped with the symptoms. He now takes half dose of metoprolol in the morning, waits a few hours and takes a second half dose. No chest pain or trouble breathing with these episodes. No thoughts of hurting self or others. Does endorse recent stressors at home. Nursing Notes were reviewed. REVIEW OF SYSTEMS    (2-9 systems for level 4, 10 or more for level 5)     Review of Systems   Constitutional:  Negative for chills and fatigue. HENT:  Negative for congestion and sore throat. Respiratory:  Negative for chest tightness and shortness of breath. Cardiovascular:  Positive for palpitations. Negative for chest pain and leg swelling. Gastrointestinal:  Negative for abdominal pain, blood in stool and constipation. Genitourinary:  Negative for dysuria and frequency. Musculoskeletal:  Negative for arthralgias and back pain. Skin:  Negative for rash and wound. Neurological:  Negative for dizziness, syncope and headaches.    Psychiatric/Behavioral:  Negative for confusion, decreased concentration, self-injury and suicidal ideas. The patient is nervous/anxious. Except as noted above the remainder of the review of systems was reviewed and negative. PAST MEDICAL HISTORY     Past Medical History:   Diagnosis Date    Essential hypertension     Hyperlipidemia     Uncontrolled type 2 diabetes mellitus with hyperglycemia ([address]          SURGICAL HISTORY     History reviewed. No pertinent surgical history. CURRENT MEDICATIONS       Previous Medications    ATORVASTATIN (LIPITOR) 20 MG TABLET    Take 1 tablet by mouth nightly    BLOOD GLUCOSE MONITORING SUPPL (ACCU-CHEK SAM CONNECT) W/DEVICE KIT    As directed    BLOOD GLUCOSE TEST STRIPS (ACCU-CHEK SAM PLUS) STRIP    1 each by In Vitro route 3 times daily As needed. INSULIN GLARGINE (LANTUS SOLOSTAR) 100 UNIT/ML INJECTION PEN    Inject 35 Units into the skin nightly    INSULIN PEN NEEDLE 32G X 4 MM MISC    1 each by Does not apply route daily    LANCETS MISC    1 each by Does not apply route 3 times daily    LANCETS MISC. (ACCU-CHEK MULTICLIX LANCET DEV) KIT    As directed    LISINOPRIL (PRINIVIL;ZESTRIL) 20 MG TABLET    Take 1 tablet by mouth daily    METFORMIN (GLUCOPHAGE) 500 MG TABLET    Take 1 tablet by mouth 2 times daily (with meals)    METOPROLOL SUCCINATE (TOPROL XL) 50 MG EXTENDED RELEASE TABLET    Take 1 tablet by mouth daily       ALLERGIES     Patient has no known allergies.     FAMILY HISTORY       Family History   Family history unknown: Yes          SOCIAL HISTORY       Social History     Socioeconomic History    Marital status:      Spouse name: None    Number of children: None    Years of education: None    Highest education level: None   Tobacco Use    Smoking status: Never    Smokeless tobacco: Never   Vaping Use    Vaping Use: Never used   Substance and Sexual Activity    Alcohol use: Never    Drug use: Never       SCREENINGS        Karan Coma Scale  Eye Opening: Spontaneous  Best Verbal Response: Oriented  Best Motor Response: Obeys commands  [place] Coma Scale Score: 15               PHYSICAL EXAM    (up to 7 for level 4, 8 or more for level 5)     ED Triage Vitals   BP Temp Temp src Pulse Resp SpO2 Height Weight   -- -- -- -- -- -- -- --       Physical Exam  Constitutional:       Appearance: Normal appearance. HENT:      Head: Normocephalic and atraumatic. Eyes:      General:         Right eye: No discharge. Left eye: No discharge. Conjunctiva/sclera: Conjunctivae normal.   Cardiovascular:      Rate and Rhythm: Normal rate and regular rhythm. Heart sounds: Normal heart sounds. No murmur heard. Pulmonary:      Effort: Pulmonary effort is normal. No respiratory distress. Breath sounds: Normal breath sounds. Abdominal:      General: Abdomen is flat. Bowel sounds are normal.      Palpations: Abdomen is soft. Tenderness: There is no abdominal tenderness. Musculoskeletal:         General: No swelling or tenderness. Right lower leg: No edema. Left lower leg: No edema. Skin:     General: Skin is warm and dry. Capillary Refill: Capillary refill takes less than 2 seconds. Neurological:      Mental Status: He is alert and oriented to person, place, and time. Comments: Speech clear. Answers all questions appropriately. Moves all 4 extremities to command. Psychiatric:         Mood and Affect: Mood normal.         Behavior: Behavior normal.       DIAGNOSTIC RESULTS     EKG: All EKG's are interpreted by the Emergency Department Physician who either signs or Co-signs this chart in the absence of a cardiologist.    The Ekg interpreted by me in the absence of a cardiologist shows. Sinus bradycardia with a ventricular rate of 55. Axis normal.  QTc appropriate. No specific ST or T wave abnormality. No significant change from prior 11-.       RADIOLOGY:   Non-plain film images such as CT, Ultrasound and MRI are read by the radiologist. Domi Qureshi radiographic images are visualized and preliminarily interpreted by the emergency physician with the below findings:        Interpretation per the Radiologist below, if available at the time of this note:    No orders to display         LABS:  [address] TO MG FOR LOW K - Abnormal; Notable for the following components:       Result Value    Glucose 119 (*)     All other components within normal limits   CBC WITH AUTO DIFFERENTIAL   TROPONIN   MAGNESIUM       All other labs were within normal range or not returned as of this dictation. EMERGENCY DEPARTMENT COURSE and DIFFERENTIAL DIAGNOSIS/MDM:   Vitals:    Vitals:    12/13/22 0727 12/13/22 0859   BP: (!) 191/82 118/86   Pulse: 61    Resp: 18    Temp: 98.5 °F (36.9 °C)    TempSrc: Oral    SpO2: 95%    Weight: 208 lb 6.4 oz (94.5 kg)    Height: 5' 7\" (1.702 m)      Vitals:    12/13/22 0859   BP: 118/86   Pulse:    Resp:    Temp:    SpO2:        Medications   hydrOXYzine pamoate (VISTARIL) capsule 50 mg (50 mg Oral Given 12/13/22 0747)           Course and MDM:  Patient arrives comfortable appearing. Hypertensive, just took half dose of metoprolol before coming in. Endorses heart racing sensation while on the monitor, I do not palpate any atypical pulse. EKG without ischemic change or arrhythmia, signs of Wellens or Brugada syndrome. Electrolytes unremarkable here. Troponin negative, low suspicion for ACS. Low suspicion for PE, pneumonia or other infectious process with exam and presentation today. TSH from last month appropriate. Vistaril given for possible anxiety. Patient observed in the emergency room and on repeat evaluation  As of 9 AM he does feel improved. Blood pressure has down trended appropriately. Does not appear decompensated from a psychiatric standpoint today. Will prescribe short course of Vistaril for symptom control and refer him to psychiatry.   Should return to the ER for chest pain or trouble breathing. He is agreeable. The patient has no social barriers affecting health care that are apparent. PROCEDURES:  Unless otherwise noted below, none     Procedures      FINAL IMPRESSION      1. Anxiety state          DISPOSITION/PLAN   DISPOSITION Decision To Discharge 12/13/2022 08:59:40 AM      PATIENT REFERRED TO:  Roque Ambrocio MD  [address] [phone] [facility] [address]  [phone]    In 1 week      Marisol Naranjo MD  [address] [facility] [address]  [phone]    In 1 week      DISCHARGE MEDICATIONS:      New Prescriptions    HYDROXYZINE PAMOATE (VISTARIL) 25 MG CAPSULE    Take 1 capsule by mouth 3 times daily as needed for Anxiety       Controlled Substances Monitoring:    If the patient was prescribed a controlled substance today, the PDMP was reviewed as documented below. No flowsheet data found.     (Please note that portions of this note were completed with a voice recognition program.  Efforts were made to edit the dictations but occasionally words are mis-transcribed.)    Angy Alatorre MD (electronically signed)  Attending Emergency Physician           Teresa Lewis MD  12/13/22 0797

## 2022-12-13 NOTE — ED NOTES
Pt discharged home, verbalized discharge instructions. Ambulated independently to exit without difficulty.         Rachael Adkins RN  12/13/22 2236

## 2023-01-30 ENCOUNTER — OFFICE VISIT (OUTPATIENT)
Dept: CARDIOLOGY CLINIC | Age: 63
End: 2023-01-30
Payer: COMMERCIAL

## 2023-01-30 VITALS
HEIGHT: 66 IN | HEART RATE: 56 BPM | DIASTOLIC BLOOD PRESSURE: 80 MMHG | WEIGHT: 213 LBS | SYSTOLIC BLOOD PRESSURE: 130 MMHG | OXYGEN SATURATION: 93 % | BODY MASS INDEX: 34.23 KG/M2

## 2023-01-30 DIAGNOSIS — F34.1 DYSTHYMIC DISORDER: ICD-10-CM

## 2023-01-30 DIAGNOSIS — R00.2 PALPITATIONS: Primary | ICD-10-CM

## 2023-01-30 DIAGNOSIS — E78.00 HIGH CHOLESTEROL: ICD-10-CM

## 2023-01-30 DIAGNOSIS — I10 ESSENTIAL HYPERTENSION: ICD-10-CM

## 2023-01-30 DIAGNOSIS — E11.00 DM HYPEROSMOLARITY TYPE II, UNCONTROLLED (HCC): ICD-10-CM

## 2023-01-30 DIAGNOSIS — E11.65 DM HYPEROSMOLARITY TYPE II, UNCONTROLLED (HCC): ICD-10-CM

## 2023-01-30 PROCEDURE — 3074F SYST BP LT 130 MM HG: CPT | Performed by: INTERNAL MEDICINE

## 2023-01-30 PROCEDURE — 99205 OFFICE O/P NEW HI 60 MIN: CPT | Performed by: INTERNAL MEDICINE

## 2023-01-30 PROCEDURE — 93000 ELECTROCARDIOGRAM COMPLETE: CPT | Performed by: INTERNAL MEDICINE

## 2023-01-30 PROCEDURE — 3078F DIAST BP <80 MM HG: CPT | Performed by: INTERNAL MEDICINE

## 2023-01-30 NOTE — PROGRESS NOTES
Cardiac Electrophysiology Consultation   Date: 1/30/2023  Reason for Consultation: palpitations   Consult Requesting Physician: Bruno Madrigal MD  Primary Care Physician: Bruno Madrigal MD    Chief Complaint:   Chief Complaint   Patient presents with    New Patient        HPI: Bette Woods is a 60 y.o. patient with a history of anxiety, type 2 diabetes mellitus,  hypertension, hyperlipidemia. Today, he presents to office for evaluation of palpitations. He states he has been having anxiety and panic attacks early December 2022 which lasted about 30 minutes. He reports a couple more episodes in December but none in January 2023 and was prescribed medications. He reported symptoms of heart racing during the panic attack. Reports compliance with medications and tolerating them well. Denies chest pain/pressure, tightness, edema, shortness of breath,  lightheadedness, dizziness, syncope, presyncope,  PND or orthopnea. Past Medical History:   Diagnosis Date    Essential hypertension     Hyperlipidemia     Uncontrolled type 2 diabetes mellitus with hyperglycemia ([address]         No past surgical history on file. Allergies:  No Known Allergies    Medication:   Prior to Admission medications    Medication Sig Start Date End Date Taking?  Authorizing Provider   metoprolol succinate (TOPROL XL) 50 MG extended release tablet Take 1.5 tablets by mouth in the morning and at bedtime 1/4/23  Yes Bruno Madrigal MD   escitalopram (LEXAPRO) 10 MG tablet Take 1 tablet by mouth daily 12/26/22  Yes Bruno Madrigal MD   metFORMIN (GLUCOPHAGE) 500 MG tablet Take 1 tablet by mouth 2 times daily (with meals) 11/18/22  Yes Bruno Madrigal MD   lisinopril (PRINIVIL;ZESTRIL) 20 MG tablet Take 1 tablet by mouth daily 11/18/22  Yes Bruno Madrigal MD   atorvastatin (LIPITOR) 20 MG tablet Take 1 tablet by mouth nightly  Patient not taking: Reported on 1/30/2023 11/18/22   Bruno Madrigal MD   insulin glargine (LANTUS SOLOSTAR) 100 UNIT/ML injection pen Inject 35 Units into the skin nightly  Patient not taking: No sig reported 3/19/21   John Crow MD   Insulin Pen Needle 32G X 4 MM MISC 1 each by Does not apply route daily  Patient not taking: Reported on 11/16/2022 3/19/21   John Crow MD   Blood Glucose Monitoring Suppl (ACCU-CHEK SAM CONNECT) w/Device KIT As directed  Patient not taking: No sig reported 3/19/21   John Crow MD   blood glucose test strips (ACCU-CHEK SAM PLUS) strip 1 each by In Vitro route 3 times daily As needed. Patient not taking: No sig reported 12/11/20   John Crow MD   Lancets MISC 1 each by Does not apply route 3 times daily  Patient not taking: No sig reported 12/11/20   John Crow MD   Lancets Misc. (Henrietta Ego) KIT As directed  Patient not taking: No sig reported 12/11/20   John Crow MD       Social History:   reports that he has never smoked. He has never used smokeless tobacco. He reports that he does not drink alcohol and does not use drugs. Family History:  Family history is unknown by patient. Reviewed. Denies family history of sudden cardiac death, arrhythmia, premature CAD    Review of System:    General ROS: negative for - chills, fever   Psychological ROS: negative for - anxiety or depression  Ophthalmic ROS: negative for - eye pain or loss of vision  ENT ROS: negative for - epistaxis, headaches, nasal discharge, sore throat   Allergy and Immunology ROS: negative for - hives, nasal congestion   Hematological and Lymphatic ROS: negative for - bleeding problems, blood clots, bruising or jaundice  Endocrine ROS: negative for - skin changes, temperature intolerance or unexpected weight changes  Respiratory ROS: negative for - cough, hemoptysis, pleuritic pain, SOB, sputum changes or wheezing  Cardiovascular ROS: Per HPI.    Gastrointestinal ROS: negative for - abdominal pain, blood in stools, diarrhea, hematemesis, melena, nausea/vomiting or swallowing difficulty/pain  Genito-Urinary ROS: negative for - dysuria or incontinence  Musculoskeletal ROS: negative for - joint swelling or muscle pain  Neurological ROS: negative for - confusion, dizziness, gait disturbance, headaches, numbness/tingling, seizures, speech problems, tremors, visual changes or weakness  Dermatological ROS: negative for - rash    Physical Examination:  Vitals:    01/30/23 1611   BP: 130/80   Pulse: 56   SpO2: 93%       Constitutional: Oriented. No distress. Head: Normocephalic and atraumatic. Mouth/Throat: Oropharynx is clear and moist.   Eyes: Conjunctivae normal. EOM are normal.   Neck: Normal range of motion. Neck supple. No rigidity. No JVD present. Cardiovascular: Bradycardic rate, regular rhythm, S1&S2 and intact distal pulses. Pulmonary/Chest: Bilateral respiratory sounds. No wheezes. No rhonchi. Abdominal: Soft. Bowel sounds present. No distension, No tenderness. Musculoskeletal: No tenderness. No edema    Lymphadenopathy: Has no cervical adenopathy. Neurological: Alert and oriented. Cranial nerve appears intact, No Gross deficit   Skin: Skin is warm and dry. No rash noted. Psychiatric: Has a normal mood, affect and behavior     Labs:  Reviewed. ECG: Sinus  rhythm with v-rate of 53 bpm with QRS duration 92 ms. No pathologic Q waves, ventricular pre-excitation, or QT prolongation. Studies:   1. Event monitor: ordered at today's office visit. 2. Echo:  ordered at today's office visit. 3. Stress Test:  n/a      4. Cath: n/a    I independently reviewed and interpreted the ECG, MCOT, echocardiogram, stress test, and coronary angiography/PCI results and used them for my plan of care. Procedures:  1.      Assessment/Plan:       Palpitations  -Occurred in setting of panic / anxiety attack.  -3 episodes in December 2022.  -No episodes in January 2023.  -30 day monitor to assess for underlying arrhythmias which may be contributing to his symptoms, if he does not experience palpitations during the time he is wearing the monitor, recommend undergoing implantation of ILR which will allow for long term monitoring up to 4.5 years for presence of arrhthymias.    -We discussed implanting an implantable loop recorder for long-term cardiac dysrhythmia monitoring in order to evaluate for possible cardiac dysrhythmia as an etiology for the patient's diagnosis, the 30 day LEVI is not revealing. The benefits and risks of implanting an implantable loop recorder has been discussed with the patient. The risks including, but not limited to, the risks of vascular injury, bleeding, infection, device malfunction, injury to cardiac and surrounding structures (including pneumothorax), stroke, myocardial infarction and death were discussed in detail. The patient was also counseled at length about the risks of kristine Covid-19 in the lena-operative and post-operative states including the recovery window of their procedure. The patient was made aware that kristine Covid-19 after a surgical procedure may worsen their prognosis for recovering from the virus and lend to a higher morbidity and or mortality risk. The patient was given the option of postponing their procedure. The patient was also presented reasonable alternatives to the proposed care, treatment, and services. The discussion I have had with the patient encompassed risks, benefits, and side effects related to the alternatives and the risks related to not receiving the proposed care, treatment and services. I spent 60 minutes face to face with the patient, with greater than 50% of that time spent in counseling on the above. The patient is aware of and understands the risks and will make is decision to proceed once monitor is completed and if indicated. Follow up:   We will call with results of Echo and 30 day monitor, abnormal we will have patient come back into clinic and discuss. Thank you for allowing me to participate in the care of Pb Pino. All questions and concerns were addressed to the patient/family. Alternatives to my treatment were discussed. This note was scribed in the presence of Dr. Aikla Cho MD by Marino Sandoval RN. The scribe's documentation has been prepared under my direction and personally reviewed by me in its entirety. I confirm that the note above accurately reflects all work, physical examination, the discussion of treatments and procedures, and medical decision making performed by me.     Akila Cho MD, MS, [facility] [place], [facility]  Cardiac Electrophysiology  [address]  [address] [facility], [facility] [place]  [address] [place] [address]  [phone]